# Patient Record
Sex: FEMALE | Race: ASIAN | Employment: UNEMPLOYED | URBAN - METROPOLITAN AREA
[De-identification: names, ages, dates, MRNs, and addresses within clinical notes are randomized per-mention and may not be internally consistent; named-entity substitution may affect disease eponyms.]

---

## 2019-01-30 NOTE — LETTER
Sima Malloy, :1965    CONSENT FOR PROCEDURE/SEDATION    1. I authorize the performance upon Sima Malloy  the following: Cervical Biopsy    2. I authorize Dr. Tomasa Briggs MD (and whomever is designated as the doctor’s assistant), to perform the above-mentioned procedures.    3. If any unforeseen conditions arise during this procedure calling for additional  procedures, operations, or medications (including anesthesia and blood transfusion), I further request and authorize the doctor to do whatever he/she deems advisable in my interest.    4. I consent to the taking and reproduction of any photographs in the course of this procedure for professional purposes.    5. I consent to the administration of such sedation as may be considered necessary or advisable by the physician responsible for this service, with the exception of ______________________________________________________    6. I have been informed by my doctor of the nature and purpose of this procedure sedation, possible alternative methods of treatment, risk involved and possible complications.    7. If I have a Do Not Resuscitate (DNR) order in place, the physician and I (or the individual authorized to consent on my behalf) will discuss and agree as to whether the Do Not Resuscitate (DNR) order will remain in effect or will be discontinued during the performance of the procedure and the applicable recovery period. If the Do Not Resuscitate (DNR) order is discontinued and is to be reinstated following the procedure/recovery period, the physician will determine when the applicable recovery period ends for purposes of reinstating the Do Not Resuscitate (DNR) order.    Signature of Patient:_______________________________________________    Signature of person authorized to consent for patient:  _______________________________________________________________    Relationship to patient: ____________________________________________    Witness:  _________________________________________ Date:___________     Physician Signature: _______________________________ Date:___________     no

## 2024-09-11 ENCOUNTER — APPOINTMENT (OUTPATIENT)
Dept: GENERAL RADIOLOGY | Facility: HOSPITAL | Age: 59
End: 2024-09-11
Attending: EMERGENCY MEDICINE
Payer: MEDICAID

## 2024-09-11 ENCOUNTER — HOSPITAL ENCOUNTER (EMERGENCY)
Facility: HOSPITAL | Age: 59
Discharge: HOME | End: 2024-09-11
Attending: EMERGENCY MEDICINE
Payer: MEDICAID

## 2024-09-11 ENCOUNTER — APPOINTMENT (OUTPATIENT)
Dept: CT IMAGING | Facility: HOSPITAL | Age: 59
End: 2024-09-11
Attending: EMERGENCY MEDICINE
Payer: MEDICAID

## 2024-09-11 VITALS
DIASTOLIC BLOOD PRESSURE: 78 MMHG | SYSTOLIC BLOOD PRESSURE: 135 MMHG | RESPIRATION RATE: 20 BRPM | OXYGEN SATURATION: 93 % | TEMPERATURE: 99 F | HEART RATE: 88 BPM

## 2024-09-11 DIAGNOSIS — J22 LOWER RESPIRATORY INFECTION (E.G., BRONCHITIS, PNEUMONIA, PNEUMONITIS, PULMONITIS): Primary | ICD-10-CM

## 2024-09-11 DIAGNOSIS — J98.01 BRONCHOSPASM: ICD-10-CM

## 2024-09-11 LAB
ALBUMIN SERPL-MCNC: 4.6 G/DL (ref 3.2–4.8)
ALBUMIN/GLOB SERPL: 1.2 {RATIO} (ref 1–2)
ALP LIVER SERPL-CCNC: 75 U/L
ALT SERPL-CCNC: 15 U/L
ANION GAP SERPL CALC-SCNC: 6 MMOL/L (ref 0–18)
AST SERPL-CCNC: 16 U/L (ref ?–34)
ATRIAL RATE: 77 BPM
BASOPHILS # BLD AUTO: 0.02 X10(3) UL (ref 0–0.2)
BASOPHILS NFR BLD AUTO: 0.2 %
BILIRUB SERPL-MCNC: 0.7 MG/DL (ref 0.2–1.1)
BUN BLD-MCNC: 10 MG/DL (ref 9–23)
CALCIUM BLD-MCNC: 9.6 MG/DL (ref 8.7–10.4)
CHLORIDE SERPL-SCNC: 105 MMOL/L (ref 98–112)
CO2 SERPL-SCNC: 26 MMOL/L (ref 21–32)
CREAT BLD-MCNC: 0.79 MG/DL
D DIMER PPP FEU-MCNC: 1.04 UG/ML FEU (ref ?–0.6)
EGFRCR SERPLBLD CKD-EPI 2021: 86 ML/MIN/1.73M2 (ref 60–?)
EOSINOPHIL # BLD AUTO: 0.18 X10(3) UL (ref 0–0.7)
EOSINOPHIL NFR BLD AUTO: 1.7 %
ERYTHROCYTE [DISTWIDTH] IN BLOOD BY AUTOMATED COUNT: 12.6 %
FLUAV + FLUBV RNA SPEC NAA+PROBE: NEGATIVE
FLUAV + FLUBV RNA SPEC NAA+PROBE: NEGATIVE
GLOBULIN PLAS-MCNC: 3.7 G/DL (ref 2–3.5)
GLUCOSE BLD-MCNC: 108 MG/DL (ref 70–99)
HCT VFR BLD AUTO: 43.1 %
HGB BLD-MCNC: 15 G/DL
IMM GRANULOCYTES # BLD AUTO: 0.03 X10(3) UL (ref 0–1)
IMM GRANULOCYTES NFR BLD: 0.3 %
LYMPHOCYTES # BLD AUTO: 1.22 X10(3) UL (ref 1–4)
LYMPHOCYTES NFR BLD AUTO: 11.6 %
MCH RBC QN AUTO: 30.9 PG (ref 26–34)
MCHC RBC AUTO-ENTMCNC: 34.8 G/DL (ref 31–37)
MCV RBC AUTO: 88.7 FL
MONOCYTES # BLD AUTO: 0.37 X10(3) UL (ref 0.1–1)
MONOCYTES NFR BLD AUTO: 3.5 %
NEUTROPHILS # BLD AUTO: 8.69 X10 (3) UL (ref 1.5–7.7)
NEUTROPHILS # BLD AUTO: 8.69 X10(3) UL (ref 1.5–7.7)
NEUTROPHILS NFR BLD AUTO: 82.7 %
NT-PROBNP SERPL-MCNC: 72 PG/ML (ref ?–125)
OSMOLALITY SERPL CALC.SUM OF ELEC: 284 MOSM/KG (ref 275–295)
P AXIS: 39 DEGREES
P-R INTERVAL: 140 MS
PLATELET # BLD AUTO: 224 10(3)UL (ref 150–450)
POTASSIUM SERPL-SCNC: 3.2 MMOL/L (ref 3.5–5.1)
PROT SERPL-MCNC: 8.3 G/DL (ref 5.7–8.2)
Q-T INTERVAL: 404 MS
QRS DURATION: 102 MS
QTC CALCULATION (BEZET): 457 MS
R AXIS: -43 DEGREES
RBC # BLD AUTO: 4.86 X10(6)UL
RSV RNA SPEC NAA+PROBE: NEGATIVE
SARS-COV-2 RNA RESP QL NAA+PROBE: NOT DETECTED
SODIUM SERPL-SCNC: 137 MMOL/L (ref 136–145)
T AXIS: 51 DEGREES
VENTRICULAR RATE: 77 BPM
WBC # BLD AUTO: 10.5 X10(3) UL (ref 4–11)

## 2024-09-11 PROCEDURE — 99285 EMERGENCY DEPT VISIT HI MDM: CPT

## 2024-09-11 PROCEDURE — 71275 CT ANGIOGRAPHY CHEST: CPT | Performed by: EMERGENCY MEDICINE

## 2024-09-11 PROCEDURE — 71045 X-RAY EXAM CHEST 1 VIEW: CPT | Performed by: EMERGENCY MEDICINE

## 2024-09-11 PROCEDURE — 94640 AIRWAY INHALATION TREATMENT: CPT

## 2024-09-11 PROCEDURE — 85379 FIBRIN DEGRADATION QUANT: CPT | Performed by: EMERGENCY MEDICINE

## 2024-09-11 PROCEDURE — 96360 HYDRATION IV INFUSION INIT: CPT

## 2024-09-11 PROCEDURE — 0241U SARS-COV-2/FLU A AND B/RSV BY PCR (GENEXPERT): CPT | Performed by: EMERGENCY MEDICINE

## 2024-09-11 PROCEDURE — 80053 COMPREHEN METABOLIC PANEL: CPT | Performed by: EMERGENCY MEDICINE

## 2024-09-11 PROCEDURE — 93005 ELECTROCARDIOGRAM TRACING: CPT

## 2024-09-11 PROCEDURE — 93010 ELECTROCARDIOGRAM REPORT: CPT

## 2024-09-11 PROCEDURE — 83880 ASSAY OF NATRIURETIC PEPTIDE: CPT | Performed by: EMERGENCY MEDICINE

## 2024-09-11 PROCEDURE — 96361 HYDRATE IV INFUSION ADD-ON: CPT

## 2024-09-11 PROCEDURE — 85025 COMPLETE CBC W/AUTO DIFF WBC: CPT | Performed by: EMERGENCY MEDICINE

## 2024-09-11 RX ORDER — SODIUM CHLORIDE 9 MG/ML
1000 INJECTION, SOLUTION INTRAVENOUS ONCE
Status: COMPLETED | OUTPATIENT
Start: 2024-09-11 | End: 2024-09-11

## 2024-09-11 RX ORDER — ALBUTEROL SULFATE 90 UG/1
2 INHALANT RESPIRATORY (INHALATION) EVERY 4 HOURS PRN
Qty: 1 EACH | Refills: 0 | Status: SHIPPED | OUTPATIENT
Start: 2024-09-11 | End: 2024-10-11

## 2024-09-11 RX ORDER — POTASSIUM CHLORIDE 1500 MG/1
40 TABLET, EXTENDED RELEASE ORAL ONCE
Status: COMPLETED | OUTPATIENT
Start: 2024-09-11 | End: 2024-09-11

## 2024-09-11 RX ORDER — ALBUTEROL SULFATE 5 MG/ML
10 SOLUTION RESPIRATORY (INHALATION) ONCE
Status: COMPLETED | OUTPATIENT
Start: 2024-09-11 | End: 2024-09-11

## 2024-09-11 RX ORDER — PREDNISONE 20 MG/1
40 TABLET ORAL DAILY
Qty: 10 TABLET | Refills: 0 | Status: SHIPPED | OUTPATIENT
Start: 2024-09-11 | End: 2024-09-16

## 2024-09-11 RX ORDER — DOXYCYCLINE 100 MG/1
100 CAPSULE ORAL 2 TIMES DAILY
Qty: 14 CAPSULE | Refills: 0 | Status: SHIPPED | OUTPATIENT
Start: 2024-09-11 | End: 2024-09-18

## 2024-09-11 RX ORDER — PREDNISONE 20 MG/1
60 TABLET ORAL ONCE
Status: COMPLETED | OUTPATIENT
Start: 2024-09-11 | End: 2024-09-11

## 2024-09-11 RX ORDER — IPRATROPIUM BROMIDE AND ALBUTEROL SULFATE 2.5; .5 MG/3ML; MG/3ML
3 SOLUTION RESPIRATORY (INHALATION) ONCE
Status: COMPLETED | OUTPATIENT
Start: 2024-09-11 | End: 2024-09-11

## 2024-09-11 NOTE — ED PROVIDER NOTES
Patient Seen in: [facility] Emergency Department      History     Chief Complaint   Patient presents with    Cough/URI    Dizziness    Difficulty Breathing     Stated Complaint: KEYONNA, Cough, Dizziness    Subjective:   HPI    58-year-old female presents for evaluation of dry cough, lightheadedness and vague shortness of breath for about 2 weeks.  No fever or bodyaches.  No sore throat.  No known sick contacts.  History of pneumonia treated with over-the-counter medications by PCP several years ago.  Denies history of TB.    Objective:   History reviewed. No pertinent past medical history.           Past Surgical History:   Procedure Laterality Date    Removal gallbladder                  Social History     Socioeconomic History    Marital status:    Tobacco Use    Smoking status: Never    Smokeless tobacco: Never   Substance and Sexual Activity    Alcohol use: Never    Drug use: Never              Review of Systems    Positive for stated Chief Complaint: Cough/URI, Dizziness, and Difficulty Breathing    Other systems are as noted in HPI.  Constitutional and vital signs reviewed.      All other systems reviewed and negative except as noted above.    Physical Exam     ED Triage Vitals   BP 09/11/24 0809 (!) 138/93   Pulse 09/11/24 0805 82   Resp 09/11/24 0809 20   Temp 09/11/24 0809 98.5 °F (36.9 °C)   Temp src 09/11/24 0809 Esophageal   SpO2 09/11/24 0805 93 %   O2 Device 09/11/24 0805 None (Room air)       Current Vitals:   Vital Signs  BP: (!) 147/100  Pulse: 66  Resp: 20  Temp: 98.5 °F (36.9 °C)  Temp src: Esophageal  MAP (mmHg): (!) 116    Oxygen Therapy  SpO2: 93 %  O2 Device: None (Room air)            Physical Exam    General: Alert, oriented, no apparent distress  HEENT:  Pupils equal reactive.  Extraocular motions intact. MMM.  Neck: Supple  Lungs: Right-sided crackles  Heart: Regular rate and rhythm.  Abdomen: Soft, nontender.   Skin: No rash.  No edema.  Neurologic: No focal neurologic  deficits.  Normal speech pattern  Musculoskeletal: No tenderness or deformity noted.  Full range of motion throughout.        ED Course     Labs Reviewed   COMP METABOLIC PANEL (14) - Abnormal; Notable for the following components:       Result Value    Glucose 108 (*)     Total Protein 8.3 (*)     Globulin  3.7 (*)     All other components within normal limits   CBC WITH DIFFERENTIAL WITH PLATELET - Abnormal; Notable for the following components:    Neutrophil Absolute Prelim 8.69 (*)     Neutrophil Absolute 8.69 (*)     All other components within normal limits   D-DIMER - Abnormal; Notable for the following components:    D-Dimer 1.04 (*)     All other components within normal limits   REDRAW POTASSIUM (P) - Abnormal; Notable for the following components:    Potassium 3.2 (*)     All other components within normal limits   PRO BETA NATRIURETIC PEPTIDE - Normal   REDRAW AST (SGOT) (P) - Normal   SARS-COV-2/FLU A AND B/RSV BY PCR (GENEXPERT) - Normal    Narrative:     This test is intended for the qualitative detection and differentiation of SARS-CoV-2, influenza A, influenza B, and respiratory syncytial virus (RSV) viral RNA in nasopharyngeal or nares swabs from individuals suspected of respiratory viral infection consistent with COVID-19 by their healthcare provider. Signs and symptoms of respiratory viral infection due to SARS-CoV-2, influenza, and RSV can be similar.    Test performed using the Xpert Xpress SARS-CoV-2/FLU/RSV (real time RT-PCR)  assay on the GeneXpert instrument, [employer], [place] [address].   This test is being used under the Food and Drug Administration's Emergency Use Authorization.    The authorized Fact Sheet for Healthcare Providers for this assay is available upon request from the laboratory.   RAINBOW DRAW LAVENDER   RAINBOW DRAW LIGHT GREEN   RAINBOW DRAW BLUE     EKG    Rate, intervals and axes as noted on EKG Report.  Rate: 77  Rhythm: Sinus Rhythm  Reading: Frequent PVCs, no ST  elevation                          MDM      Differential diagnosis includes pneumonia, viral syndrome, pulmonary edema, pneumonia      I have independently visualized the radiology images, my focus/limited interpretation: No focal consolidation or pulmonary edema    Defer to radiologist for other/incidental findings        CBC normal.    Chemistry with slightly low potassium which was repleted.    CTA CHEST (CPT=71275)    Result Date: 9/11/2024  PROCEDURE:  CT ANGIOGRAPHY, CHEST (CPT=71275)  COMPARISON:  [facility] , XR, XR CHEST AP PORTABLE  (CPT=71045), 9/11/2024, 8:58 AM.  INDICATIONS:  KEYONNA, Cough, Dizziness  TECHNIQUE:  IV contrast-enhanced multislice CT angiography is performed through the pulmonary arterial anatomy. 3D volume renderings are generated.  Dose reduction techniques were used. Dose information is transmitted to the ACR (American College of Radiology) NRDR (National Radiology Data Registry) which includes the Dose Index Registry.  PATIENT STATED HISTORY:(As transcribed by Technologist)  Patient is here for shortness of breath, cough and dizziness for 2 weeks.   CONTRAST USED:  100cc of Isovue 370  FINDINGS:  VASCULATURE:  No visible pulmonary arterial thrombus or attenuation.  THORACIC AORTA:  Ascending thoracic aorta measures 4.2 x 3.9 cm.  Descending thoracic aorta measures 2.4 x 2.3 cm.  No visible dissection.  LUNGS:  No focal consolidation.  Left lower lobe pleural based 5 mm nodule is nonspecific.  Peribronchial thickening.  Scattered interstitial densities involve the right middle and bilateral lower lobes suggest atelectasis and or scar.  MEDIASTINUM:  Scattered nodes may be reactive.  ANYI:  Bilateral soft tissue attenuation may be due to reactive nodes.  CARDIAC:  No enlargement, pericardial thickening, or significant coronary artery calcification. PLEURA:  No mass or effusion.  CHEST WALL:  No mass or axillary adenopathy.  LIMITED ABDOMEN:  Limited images of the upper abdomen are  unremarkable.  BONES:  Degenerative change with [id number] osseous hemangioma.  No fracture.             CONCLUSION:  1. No CT evidence for pulmonary embolism. 2. Ascending thoracic aorta measures 4.2 cm.  Follow-up can be performed to ensure stability as clinically indicated. 3. Peribronchial thickening may represent bronchitis, correlate clinically. 4. Left lower lobe pleural based 5 mm nodule is nonspecific.  Follow-up as clinically indicated.  The findings include a single, incidentally detected, solid pulmonary nodule, measuring less than 6mm.  2017 guidelines from the Fleischner Society for the follow-up and management of incidentally detected indeterminate pulmonary nodules in persons at least 35 years of age depend on nodule size (average length and width) and underlying risk factors (including smoking and other risk factors). Please consider the following recommendations after clinical assessment of risk factors.  For <6mm solid nodules: In low risk patients, no follow-up required.  If suspicious morphology or upper lobe location, consider 12 month follow-up.  In high risk patients, optional CT in 12 months.     LOCATION:  [facility]   Dictated by (CST): Loretta Moore MD on 9/11/2024 at 10:45 AM     Finalized by (CST): Loretta Moore MD on 9/11/2024 at 10:51 AM       XR CHEST AP PORTABLE  (CPT=71045)    Result Date: 9/11/2024  PROCEDURE:  XR CHEST AP PORTABLE  (CPT=71045)  TECHNIQUE:  AP chest radiograph was obtained.  COMPARISON:  None.  INDICATIONS:  KEYONNA, Cough, Dizziness  PATIENT STATED HISTORY: (As transcribed by Technologist)  Cough, wheezing, difficulty breathing for 2 weeks.              CONCLUSION:   Heart size upper limits normal.  Mild diffuse interstitial and peribronchial thickening suggesting bronchitis or reactive airway disease/asthma.  Patchy left infrahilar opacity favors partial atelectasis.  No associated pleural effusion or pneumothorax.  LOCATION:  [facility]      Dictated by (CST): Shavonne Choudhary MD on  9/11/2024 at 9:08 AM     Finalized by (CST): Shavonne Choudhary MD on 9/11/2024 at 9:09 AM        Medications   sodium chloride 0.9% infusion 1,000 mL (1,000 mL Intravenous New Bag 9/11/24 0842)   ipratropium-albuterol (Duoneb) 0.5-2.5 (3) MG/3ML inhalation solution 3 mL (3 mL Nebulization Given 9/11/24 0922)   iopamidol 76% (ISOVUE-370) injection for power injector (100 mL Intravenous Given 9/11/24 1036)   potassium chloride (Klor-Con M20) tab 40 mEq (40 mEq Oral Given 9/11/24 1114)   predniSONE (Deltasone) tab 60 mg (60 mg Oral Given 9/11/24 1114)   albuterol (Ventolin) (5 MG/ML) 0.5% nebulizer solution 10 mg (10 mg Nebulization Given 9/11/24 1141)     Better after medication.  Plan for prednisone, albuterol and doxycycline at home.  Follow-up with referral physician if not better in about 3 days.  Return here if symptoms are worsening or changing                  Medical Decision Making  Amount and/or Complexity of Data Reviewed  Independent Historian: caregiver     Details: Son with whom she lives at bedside        Disposition and Plan     Clinical Impression:  1. Lower respiratory infection (e.g., bronchitis, pneumonia, pneumonitis, pulmonitis)    2. Bronchospasm         Disposition:  Discharge  9/11/2024 12:16 pm    Follow-up:  Jeramie Bean MD  [address] [place] [address]  [phone]    Schedule an appointment as soon as possible for a visit in 3 day(s)            Medications Prescribed:  Current Discharge Medication List        START taking these medications    Details   predniSONE 20 MG Oral Tab Take 2 tablets (40 mg total) by mouth daily for 5 days.  Qty: 10 tablet, Refills: 0      doxycycline 100 MG Oral Cap Take 1 capsule (100 mg total) by mouth 2 (two) times daily for 7 days.  Qty: 14 capsule, Refills: 0      albuterol 108 (90 Base) MCG/ACT Inhalation Aero Soln Inhale 2 puffs into the lungs every 4 (four) hours as needed for Wheezing.  Qty: 1 each, Refills: 0

## 2024-09-17 ENCOUNTER — TELEPHONE (OUTPATIENT)
Dept: FAMILY MEDICINE CLINIC | Facility: CLINIC | Age: 59
End: 2024-09-17
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
## 2024-09-18 ENCOUNTER — TELEPHONE (OUTPATIENT)
Facility: CLINIC | Age: 59
End: 2024-09-18

## 2024-09-18 NOTE — TELEPHONE ENCOUNTER
LVM on son's line as requested to offer pulmonary consult. We are holding a potential appointment for Friday 9/20 as pt does see their new PCP on Thursday.   LVM with our contact information that we are available if they wish to schedule an appointment.

## 2024-09-19 ENCOUNTER — LAB ENCOUNTER (OUTPATIENT)
Dept: LAB | Age: 59
End: 2024-09-19
Attending: STUDENT IN AN ORGANIZED HEALTH CARE EDUCATION/TRAINING PROGRAM
Payer: MEDICAID

## 2024-09-19 ENCOUNTER — HOSPITAL ENCOUNTER (OUTPATIENT)
Dept: GENERAL RADIOLOGY | Age: 59
Discharge: HOME | End: 2024-09-19
Attending: STUDENT IN AN ORGANIZED HEALTH CARE EDUCATION/TRAINING PROGRAM
Payer: MEDICAID

## 2024-09-19 ENCOUNTER — OFFICE VISIT (OUTPATIENT)
Dept: INTERNAL MEDICINE CLINIC | Facility: CLINIC | Age: 59
End: 2024-09-19
Payer: MEDICAID

## 2024-09-19 VITALS
OXYGEN SATURATION: 95 % | HEART RATE: 70 BPM | SYSTOLIC BLOOD PRESSURE: 128 MMHG | DIASTOLIC BLOOD PRESSURE: 80 MMHG | BODY MASS INDEX: 32.94 KG/M2 | RESPIRATION RATE: 17 BRPM | WEIGHT: 179 LBS | TEMPERATURE: 98 F | HEIGHT: 62 IN

## 2024-09-19 DIAGNOSIS — Z13.0 SCREENING FOR DEFICIENCY ANEMIA: ICD-10-CM

## 2024-09-19 DIAGNOSIS — Z00.00 PHYSICAL EXAM, ANNUAL: Primary | ICD-10-CM

## 2024-09-19 DIAGNOSIS — R41.3 MEMORY CHANGES: ICD-10-CM

## 2024-09-19 DIAGNOSIS — I77.810 ASCENDING AORTA DILATATION (HCC): ICD-10-CM

## 2024-09-19 DIAGNOSIS — J40 BRONCHITIS: ICD-10-CM

## 2024-09-19 DIAGNOSIS — Z11.59 NEED FOR HEPATITIS C SCREENING TEST: ICD-10-CM

## 2024-09-19 DIAGNOSIS — Z13.29 SCREENING FOR THYROID DISORDER: ICD-10-CM

## 2024-09-19 DIAGNOSIS — Z13.228 SCREENING FOR ENDOCRINE, METABOLIC AND IMMUNITY DISORDER: ICD-10-CM

## 2024-09-19 DIAGNOSIS — Z11.4 SCREENING FOR HIV (HUMAN IMMUNODEFICIENCY VIRUS): ICD-10-CM

## 2024-09-19 DIAGNOSIS — Z13.220 SCREENING FOR LIPID DISORDERS: ICD-10-CM

## 2024-09-19 DIAGNOSIS — Z12.11 SCREENING FOR COLON CANCER: ICD-10-CM

## 2024-09-19 DIAGNOSIS — Z13.0 SCREENING FOR ENDOCRINE, METABOLIC AND IMMUNITY DISORDER: ICD-10-CM

## 2024-09-19 DIAGNOSIS — R68.89 FORGETFULNESS: ICD-10-CM

## 2024-09-19 DIAGNOSIS — R39.15 URINARY URGENCY: ICD-10-CM

## 2024-09-19 DIAGNOSIS — R06.02 SHORTNESS OF BREATH: ICD-10-CM

## 2024-09-19 DIAGNOSIS — J20.8 VIRAL BRONCHITIS: ICD-10-CM

## 2024-09-19 DIAGNOSIS — Z12.4 SCREENING FOR CERVICAL CANCER: ICD-10-CM

## 2024-09-19 DIAGNOSIS — Z13.1 SCREENING FOR DIABETES MELLITUS: ICD-10-CM

## 2024-09-19 DIAGNOSIS — E55.9 VITAMIN D DEFICIENCY: ICD-10-CM

## 2024-09-19 DIAGNOSIS — I49.3 PVC'S (PREMATURE VENTRICULAR CONTRACTIONS): ICD-10-CM

## 2024-09-19 DIAGNOSIS — Z11.59 NEED FOR HEPATITIS B SCREENING TEST: ICD-10-CM

## 2024-09-19 DIAGNOSIS — R05.2 SUBACUTE COUGH: ICD-10-CM

## 2024-09-19 DIAGNOSIS — Z13.29 SCREENING FOR ENDOCRINE, METABOLIC AND IMMUNITY DISORDER: ICD-10-CM

## 2024-09-19 DIAGNOSIS — J01.90 ACUTE NON-RECURRENT SINUSITIS, UNSPECIFIED LOCATION: ICD-10-CM

## 2024-09-19 DIAGNOSIS — Z12.31 SCREENING MAMMOGRAM FOR BREAST CANCER: ICD-10-CM

## 2024-09-19 LAB
ALBUMIN SERPL-MCNC: 4.3 G/DL (ref 3.2–4.8)
ALBUMIN/GLOB SERPL: 1.3 {RATIO} (ref 1–2)
ALP LIVER SERPL-CCNC: 78 U/L
ALT SERPL-CCNC: 22 U/L
ANION GAP SERPL CALC-SCNC: 8 MMOL/L (ref 0–18)
AST SERPL-CCNC: 25 U/L (ref ?–34)
BASOPHILS # BLD AUTO: 0.07 X10(3) UL (ref 0–0.2)
BASOPHILS NFR BLD AUTO: 0.9 %
BILIRUB SERPL-MCNC: 0.8 MG/DL (ref 0.2–1.1)
BILIRUB UR QL STRIP.AUTO: NEGATIVE
BUN BLD-MCNC: 15 MG/DL (ref 9–23)
CALCIUM BLD-MCNC: 9.9 MG/DL (ref 8.7–10.4)
CHLORIDE SERPL-SCNC: 104 MMOL/L (ref 98–112)
CHOLEST SERPL-MCNC: 189 MG/DL (ref ?–200)
CO2 SERPL-SCNC: 29 MMOL/L (ref 21–32)
COLOR UR AUTO: YELLOW
CREAT BLD-MCNC: 0.83 MG/DL
EGFRCR SERPLBLD CKD-EPI 2021: 80 ML/MIN/1.73M2 (ref 60–?)
EOSINOPHIL # BLD AUTO: 0.36 X10(3) UL (ref 0–0.7)
EOSINOPHIL NFR BLD AUTO: 4.5 %
ERYTHROCYTE [DISTWIDTH] IN BLOOD BY AUTOMATED COUNT: 12.7 %
EST. AVERAGE GLUCOSE BLD GHB EST-MCNC: 128 MG/DL (ref 68–126)
FASTING PATIENT LIPID ANSWER: YES
FASTING STATUS PATIENT QL REPORTED: YES
FOLATE SERPL-MCNC: 11 NG/ML (ref 5.4–?)
GLOBULIN PLAS-MCNC: 3.3 G/DL (ref 2–3.5)
GLUCOSE BLD-MCNC: 93 MG/DL (ref 70–99)
GLUCOSE UR STRIP.AUTO-MCNC: NORMAL MG/DL
HBA1C MFR BLD: 6.1 % (ref ?–5.7)
HBV SURFACE AB SER QL: NONREACTIVE
HBV SURFACE AB SERPL IA-ACNC: <3.1 MIU/ML
HBV SURFACE AG SER-ACNC: <0.1 [IU]/L
HBV SURFACE AG SERPL QL IA: NONREACTIVE
HCT VFR BLD AUTO: 42.8 %
HCV AB SERPL QL IA: NONREACTIVE
HDLC SERPL-MCNC: 51 MG/DL (ref 40–59)
HGB BLD-MCNC: 14.9 G/DL
HYALINE CASTS #/AREA URNS AUTO: PRESENT /LPF
IMM GRANULOCYTES # BLD AUTO: 0.03 X10(3) UL (ref 0–1)
IMM GRANULOCYTES NFR BLD: 0.4 %
KETONES UR STRIP.AUTO-MCNC: NEGATIVE MG/DL
LDLC SERPL CALC-MCNC: 119 MG/DL (ref ?–100)
LEUKOCYTE ESTERASE UR QL STRIP.AUTO: 250
LYMPHOCYTES # BLD AUTO: 1.96 X10(3) UL (ref 1–4)
LYMPHOCYTES NFR BLD AUTO: 24.3 %
MCH RBC QN AUTO: 30.8 PG (ref 26–34)
MCHC RBC AUTO-ENTMCNC: 34.8 G/DL (ref 31–37)
MCV RBC AUTO: 88.6 FL
MONOCYTES # BLD AUTO: 0.52 X10(3) UL (ref 0.1–1)
MONOCYTES NFR BLD AUTO: 6.4 %
NEUTROPHILS # BLD AUTO: 5.14 X10 (3) UL (ref 1.5–7.7)
NEUTROPHILS # BLD AUTO: 5.14 X10(3) UL (ref 1.5–7.7)
NEUTROPHILS NFR BLD AUTO: 63.5 %
NITRITE UR QL STRIP.AUTO: NEGATIVE
NONHDLC SERPL-MCNC: 138 MG/DL (ref ?–130)
OSMOLALITY SERPL CALC.SUM OF ELEC: 293 MOSM/KG (ref 275–295)
PH UR STRIP.AUTO: 5.5 [PH] (ref 5–8)
PLATELET # BLD AUTO: 238 10(3)UL (ref 150–450)
POTASSIUM SERPL-SCNC: 3.1 MMOL/L (ref 3.5–5.1)
PROT SERPL-MCNC: 7.6 G/DL (ref 5.7–8.2)
PROT UR STRIP.AUTO-MCNC: 20 MG/DL
RBC # BLD AUTO: 4.83 X10(6)UL
SODIUM SERPL-SCNC: 141 MMOL/L (ref 136–145)
SP GR UR STRIP.AUTO: 1.02 (ref 1–1.03)
TRIGL SERPL-MCNC: 106 MG/DL (ref 30–149)
TSI SER-ACNC: 3.75 MIU/ML (ref 0.55–4.78)
UROBILINOGEN UR STRIP.AUTO-MCNC: NORMAL MG/DL
VIT B12 SERPL-MCNC: 436 PG/ML (ref 211–911)
VIT D+METAB SERPL-MCNC: 20.8 NG/ML (ref 30–100)
VLDLC SERPL CALC-MCNC: 19 MG/DL (ref 0–30)
WBC # BLD AUTO: 8.1 X10(3) UL (ref 4–11)

## 2024-09-19 PROCEDURE — 83036 HEMOGLOBIN GLYCOSYLATED A1C: CPT

## 2024-09-19 PROCEDURE — 87186 SC STD MICRODIL/AGAR DIL: CPT | Performed by: STUDENT IN AN ORGANIZED HEALTH CARE EDUCATION/TRAINING PROGRAM

## 2024-09-19 PROCEDURE — 80061 LIPID PANEL: CPT

## 2024-09-19 PROCEDURE — 87340 HEPATITIS B SURFACE AG IA: CPT

## 2024-09-19 PROCEDURE — 36415 COLL VENOUS BLD VENIPUNCTURE: CPT

## 2024-09-19 PROCEDURE — 87077 CULTURE AEROBIC IDENTIFY: CPT | Performed by: STUDENT IN AN ORGANIZED HEALTH CARE EDUCATION/TRAINING PROGRAM

## 2024-09-19 PROCEDURE — 84443 ASSAY THYROID STIM HORMONE: CPT

## 2024-09-19 PROCEDURE — 80053 COMPREHEN METABOLIC PANEL: CPT

## 2024-09-19 PROCEDURE — 86003 ALLG SPEC IGE CRUDE XTRC EA: CPT

## 2024-09-19 PROCEDURE — 86803 HEPATITIS C AB TEST: CPT

## 2024-09-19 PROCEDURE — 82607 VITAMIN B-12: CPT

## 2024-09-19 PROCEDURE — 87086 URINE CULTURE/COLONY COUNT: CPT | Performed by: STUDENT IN AN ORGANIZED HEALTH CARE EDUCATION/TRAINING PROGRAM

## 2024-09-19 PROCEDURE — 82306 VITAMIN D 25 HYDROXY: CPT

## 2024-09-19 PROCEDURE — 84207 ASSAY OF VITAMIN B-6: CPT

## 2024-09-19 PROCEDURE — 87389 HIV-1 AG W/HIV-1&-2 AB AG IA: CPT

## 2024-09-19 PROCEDURE — 82785 ASSAY OF IGE: CPT

## 2024-09-19 PROCEDURE — 81001 URINALYSIS AUTO W/SCOPE: CPT | Performed by: STUDENT IN AN ORGANIZED HEALTH CARE EDUCATION/TRAINING PROGRAM

## 2024-09-19 PROCEDURE — 86706 HEP B SURFACE ANTIBODY: CPT

## 2024-09-19 PROCEDURE — 85025 COMPLETE CBC W/AUTO DIFF WBC: CPT

## 2024-09-19 PROCEDURE — 82746 ASSAY OF FOLIC ACID SERUM: CPT

## 2024-09-19 PROCEDURE — 71046 X-RAY EXAM CHEST 2 VIEWS: CPT | Performed by: STUDENT IN AN ORGANIZED HEALTH CARE EDUCATION/TRAINING PROGRAM

## 2024-09-19 RX ORDER — NEBULIZER ACCESSORIES
KIT MISCELLANEOUS
Qty: 1 EACH | Refills: 0 | Status: SHIPPED | OUTPATIENT
Start: 2024-09-19

## 2024-09-19 RX ORDER — FLUTICASONE PROPIONATE 50 MCG
1 SPRAY, SUSPENSION (ML) NASAL DAILY
Qty: 11.1 ML | Refills: 0 | Status: SHIPPED | OUTPATIENT
Start: 2024-09-19 | End: 2025-09-14

## 2024-09-19 RX ORDER — CODEINE PHOSPHATE AND GUAIFENESIN 10; 100 MG/5ML; MG/5ML
5 SOLUTION ORAL NIGHTLY PRN
Qty: 180 ML | Refills: 0 | Status: SHIPPED | OUTPATIENT
Start: 2024-09-19

## 2024-09-19 RX ORDER — BENZONATATE 200 MG/1
200 CAPSULE ORAL 3 TIMES DAILY PRN
Qty: 30 CAPSULE | Refills: 0 | Status: SHIPPED | OUTPATIENT
Start: 2024-09-19

## 2024-09-19 RX ORDER — PREDNISONE 20 MG/1
TABLET ORAL
Qty: 7 TABLET | Refills: 0 | Status: SHIPPED | OUTPATIENT
Start: 2024-09-19 | End: 2024-09-25

## 2024-09-19 RX ORDER — IPRATROPIUM BROMIDE AND ALBUTEROL SULFATE 2.5; .5 MG/3ML; MG/3ML
3 SOLUTION RESPIRATORY (INHALATION) EVERY 6 HOURS PRN
Qty: 84 ML | Refills: 1 | Status: SHIPPED | OUTPATIENT
Start: 2024-09-19

## 2024-09-19 RX ORDER — AZITHROMYCIN 250 MG/1
TABLET, FILM COATED ORAL
Qty: 6 TABLET | Refills: 0 | Status: SHIPPED | OUTPATIENT
Start: 2024-09-19 | End: 2024-09-23

## 2024-09-19 NOTE — PROGRESS NOTES
[facility]    CHIEF COMPLAINT:   Chief Complaint   Patient presents with    ER F/U     Cough, SOB, Headache.     Routine Physical     New patient.         HPI:   Sima Malloy is a 59 year old female who presents for a complete physical exam.   Patient Active Problem List   Diagnosis    Ascending aorta dilatation (HCC)    PVC's (premature ventricular contractions)    Vitamin D deficiency    Memory changes      [place] originally from [place], [place].  Has 3 children, son in [place] (radiologist). Another son and a daughter are in the [place].    for 38 years.    Today c/o Cough for the past 3 weeks, was in the ER 1 week ago for the cough  CXR: showed :  Heart size upper limits normal.  Mild diffuse interstitial and peribronchial thickening suggesting bronchitis or reactive airway disease/asthma.  Patchy left infrahilar opacity favors partial atelectasis. No associated pleural effusion or pneumothorax.   EKG: Sinus rhythm with frequent Premature ventricular complexes   Nonspecific T wave abnormality.  Labs : CBC with normal WBC, but  mild left shift  CMP with Normal Creatinine and ALT, Potassium was slightly low - replaced.  COVID negative  Pro BMP normal   D dimer was elevated, CTA was done, was negative for PE, but  showed : Ascending thoracic aorta measures 4.2 cm, Peribronchial thickening may represent bronchitis, Left lower lobe pleural based 5 mm nodule is nonspecific.     Was prescribed:  Prednisone 40 mg for 5 days - completed  Albuterol inhaler - takes 3-4 times a day  Doxycycline 100 mg BID - took 7 day course, completed    No improvement after the treatment and symptoms are becoming worse and now wake her up from sleep. Cough is unproductive.    Had similar cough in the past (about 6 months ago), was prescribed Azithromycin with resolution of the cough at that time.      Also c/o other multiple issues, including urinary frequency and urgency, fatigue, forgetfulness.    Diet:  [place] diet, high on fats and carbs  Exercise:  not much    Vaccinations:  Influenza: Will need to get in the pharmacy  COVID: Will need to get in the pharmacy  RSV:  8/20/2024  Pneumococcal: 8/20/2024 (Prevnar 20)  Shingles: Shingrix x 1 (8/20/2024)  Tdap/Td: 08/20/2024    Screenings:  Mammogram: Never done, will order   Colonoscopy screen: Never done, will send referral to GI  Dexa: Not due  PAP: Never done, will send referral to GYN    Diabetes screen? (age 35 to 70 who are overweight or obese): A1c ordered  Lipid panel? (age 20-35 with increased risk of CHD or older than 35): ordered    Wt Readings from Last 6 Encounters:   09/19/24 179 lb (81.2 kg)     Body mass index is 32.74 kg/m².       Current Outpatient Medications   Medication Sig Dispense Refill    benzonatate 200 MG Oral Cap Take 1 capsule (200 mg total) by mouth 3 (three) times daily as needed. 30 capsule 0    guaiFENesin-codeine 100-10 MG/5ML Oral Solution Take 5 mL by mouth nightly as needed for cough. 180 mL 0    Respiratory Therapy Supplies (NEBULIZER/TUBING/MOUTHPIECE) Does not apply Kit To use for Neb treatments 1 each 0    fluticasone propionate 50 MCG/ACT Nasal Suspension 1 spray by Each Nare route daily. 11.1 mL 0    predniSONE 20 MG Oral Tab Take 2 tablets (40 mg total) by mouth daily for 2 days, THEN 1 tablet (20 mg total) daily for 2 days, THEN 0.5 tablets (10 mg total) daily for 2 days. 7 tablet 0    ipratropium-albuterol 0.5-2.5 (3) MG/3ML Inhalation Solution Take 3 mL by nebulization every 6 (six) hours as needed. 84 mL 1    azithromycin 250 MG Oral Tab Take 2 tablets (500 mg total) by mouth daily for 1 day, THEN 1 tablet (250 mg total) daily for 4 days. 6 tablet 0    albuterol 108 (90 Base) MCG/ACT Inhalation Aero Soln Inhale 2 puffs into the lungs every 4 (four) hours as needed for Wheezing. 1 each 0      History reviewed. No pertinent past medical history.   Past Surgical History:   Procedure Laterality Date    Removal gallbladder         Family History   Problem Relation Age of Onset    Stroke Father     Diabetes Mother     Diabetes Son     Diabetes Sister     Diabetes Brother            REVIEW OF SYSTEMS:   Review of Systems   Constitutional:  Positive for malaise/fatigue. Negative for chills and fever.   HENT:  Positive for congestion and sore throat. Negative for ear discharge, ear pain and sinus pain.    Eyes: Negative.    Respiratory:  Positive for cough, shortness of breath and wheezing. Negative for hemoptysis, sputum production and stridor.    Cardiovascular: Negative.  Negative for leg swelling.   Gastrointestinal:  Negative for abdominal pain, constipation, diarrhea, heartburn, nausea and vomiting.   Genitourinary:  Positive for urgency.   Musculoskeletal: Negative.    Skin:  Negative for itching and rash.   Neurological:  Negative for dizziness, tingling, sensory change, seizures, loss of consciousness and weakness.        EXAM:   /80 (BP Location: Right arm, Patient Position: Sitting, Cuff Size: adult)   Pulse 70   Temp 97.9 °F (36.6 °C)   Resp 17   Ht 5' 2\" (1.575 m)   Wt 179 lb (81.2 kg)   SpO2 95%   BMI 32.74 kg/m²   Body mass index is 32.74 kg/m².   Physical Exam  Constitutional:       General: She is not in acute distress.     Appearance: She is obese. She is not ill-appearing or toxic-appearing.   HENT:      Head: Normocephalic and atraumatic.      Right Ear: Tympanic membrane, ear canal and external ear normal.      Left Ear: Tympanic membrane, ear canal and external ear normal.      Nose: Congestion and rhinorrhea present.      Mouth/Throat:      Pharynx: No oropharyngeal exudate or posterior oropharyngeal erythema.   Eyes:      Extraocular Movements: Extraocular movements intact.      Conjunctiva/sclera: Conjunctivae normal.      Pupils: Pupils are equal, round, and reactive to light.   Cardiovascular:      Rate and Rhythm: Normal rate.      Heart sounds: Normal heart sounds.      Comments: Some extra beats  noted  Pulmonary:      Effort: Pulmonary effort is normal. No respiratory distress.      Breath sounds: Wheezing and rhonchi (scattered) present. No rales.   Chest:      Chest wall: No tenderness.   Abdominal:      General: Abdomen is flat. Bowel sounds are normal. There is no distension.      Palpations: Abdomen is soft. There is no mass.      Tenderness: There is no abdominal tenderness. There is no right CVA tenderness, left CVA tenderness or guarding.      Hernia: No hernia is present.   Musculoskeletal:         General: No swelling.      Cervical back: Normal range of motion.      Right lower leg: No edema.      Left lower leg: No edema.   Lymphadenopathy:      Cervical: No cervical adenopathy.   Skin:     General: Skin is warm.      Capillary Refill: Capillary refill takes less than 2 seconds.      Coloration: Skin is not jaundiced or pale.      Findings: No bruising, erythema or lesion.   Neurological:      General: No focal deficit present.      Mental Status: She is alert and oriented to person, place, and time.      Cranial Nerves: No cranial nerve deficit.      Sensory: No sensory deficit.      Motor: No weakness.      Gait: Gait normal.   Psychiatric:         Mood and Affect: Mood normal.         Behavior: Behavior normal.         Thought Content: Thought content normal.         Judgment: Judgment normal.          Labs:   Lab Results   Component Value Date/Time    WBC 10.5 09/11/2024 08:45 AM    HGB 15.0 09/11/2024 08:45 AM    .0 09/11/2024 08:45 AM      Lab Results   Component Value Date/Time     (H) 09/11/2024 08:45 AM     09/11/2024 08:45 AM    K 3.2 (L) 09/11/2024 09:55 AM     09/11/2024 08:45 AM    CO2 26.0 09/11/2024 08:45 AM    CREATSERUM 0.79 09/11/2024 08:45 AM    CA 9.6 09/11/2024 08:45 AM    ALB 4.6 09/11/2024 08:45 AM    TP 8.3 (H) 09/11/2024 08:45 AM    ALKPHO 75 09/11/2024 08:45 AM    AST 16 09/11/2024 09:55 AM    ALT 15 09/11/2024 08:45 AM    BILT 0.7 09/11/2024  08:45 AM        No results found for: \"CHOLEST\", \"HDL\", \"TRIG\", \"LDL\", \"NONHDLC\"    No results found for: \"A1C\"   Vitamin D:    No results found for: \"VITD\"        ASSESSMENT AND PLAN:   Sima Malloy is a 59 year old female who presents for a complete physical exam.     1. Physical exam, annual  Pap, pelvic, and breast exam deferred to GYN. Pt' s weight is Body mass index is 32.74 kg/m². Recommended regular exercise.   Age appropriate screenings discussed and orders placed.  The patient indicates understanding of these issues and agrees to the plan.  Annual screening labs ordered    2. Subacute cough  3. Shortness of breath  Cough not getting better after treatment with Prednisone for 5 days, Doxy for 7 days and albuterol inhaler. Will order CXR to rule out PNA at this time.  Unlikely CHF as Pro BNP was normal , no JVD or LE edema on exam.   Would give additional prednisone taper for 6 days  Will order Azithromycin for its antiinflammatory effects (and patient states it helped her in the past)   Should do Duoneds around the clock QID, states has nebulizer at home. To do benzonatate around the clock.  Depending on CXR - will decide of additional antibiotics needed.  Patient also has appt with Pulmonology for tomorrow  - benzonatate 200 MG Oral Cap; Take 1 capsule (200 mg total) by mouth 3 (three) times daily as needed.  Dispense: 30 capsule; Refill: 0  - guaiFENesin-codeine 100-10 MG/5ML Oral Solution; Take 5 mL by mouth nightly as needed for cough.  Dispense: 180 mL; Refill: 0  - Respiratory Therapy Supplies (NEBULIZER/TUBING/MOUTHPIECE) Does not apply Kit; To use for Neb treatments  Dispense: 1 each; Refill: 0  - fluticasone propionate 50 MCG/ACT Nasal Suspension; 1 spray by Each Nare route daily.  Dispense: 11.1 mL; Refill: 0  - XR CHEST PA + LAT CHEST (CPT=71046); Future  - predniSONE 20 MG Oral Tab; Take 2 tablets (40 mg total) by mouth daily for 2 days, THEN 1 tablet (20 mg total) daily for 2 days, THEN 0.5  tablets (10 mg total) daily for 2 days.  Dispense: 7 tablet; Refill: 0    4. Forgetfulness  Wants to check the vitamins as she was deficient in the past. Would like to get memory testing as well.  - Vitamin B12 [E]; Future  - Folic Acid Serum [E]; Future  - Vitamin B6; Future  - PSYCHOLOGY - INTERNAL    5. Acute non-recurrent sinusitis, unspecified location  Symptoms of nasal congestion, nasal discharge and postnasal drip consistent with sinusitis. Will benefit from Azithromycin.  - azithromycin 250 MG Oral Tab; Take 2 tablets (500 mg total) by mouth daily for 1 day, THEN 1 tablet (250 mg total) daily for 4 days.  Dispense: 6 tablet; Refill: 0    6. Bronchitis  - azithromycin 250 MG Oral Tab; Take 2 tablets (500 mg total) by mouth daily for 1 day, THEN 1 tablet (250 mg total) daily for 4 days.  Dispense: 6 tablet; Refill: 0    7. Ascending aorta dilatation (HCC) - Incidental finding on imaging in the ER  8. PVCs noted on EKG in the ER  - CARD ECHO 2D DOPPLER (CPT=93306); Future  - CT CALCIUM SCORING; Future    9. Screening for deficiency anemia  - CBC With Differential With Platelet; Future    10.  - Vitamin B12 [E]; Future  - Folic Acid Serum [E]; Future  - Vitamin B6; Future  - PSYCHOLOGY - INTERNAL    11. Screening for endocrine, metabolic and immunity disorder  - Comp Metabolic Panel (14); Future    12. Screening for lipid disorders  - Lipid Panel; Future    13. Screening for diabetes mellitus  - Hemoglobin A1C; Future    14. Screening for thyroid disorder  - TSH W Reflex To Free T4; Future    15. Screening for colon cancer  - GASTRO - INTERNAL    16. Need for hepatitis B screening test  - Hepatitis B Surface Antibody; Future  - Hepatitis B Surface Antigen; Future    17. Screening for HIV (human immunodeficiency virus)  - HIV Ag/Ab Combo; Future    18. Need for hepatitis C screening test  - HCV Antibody; Future    19. Screening mammogram for breast cancer  - [facility] IAN 2D+3D SCREENING BILAT (CPT=77067/50786);  Future    20. Screening for cervical cancer  - OBG Referral - In Network    21. Vitamin D deficiency  - Vitamin D; Future     22. Urinary Urgency  - UA/M With Culture Reflex [E]; Future  - UA/M With Culture Reflex [E]    Return in about 6 months (around 3/19/2025) for med check or earlies if needed depending on labs.    Stephy Lora MD

## 2024-09-20 ENCOUNTER — OFFICE VISIT (OUTPATIENT)
Facility: CLINIC | Age: 59
End: 2024-09-20
Payer: MEDICAID

## 2024-09-20 VITALS
BODY MASS INDEX: 32.94 KG/M2 | OXYGEN SATURATION: 98 % | RESPIRATION RATE: 16 BRPM | HEIGHT: 62 IN | WEIGHT: 179 LBS | HEART RATE: 78 BPM

## 2024-09-20 DIAGNOSIS — E87.6 HYPOKALEMIA: Primary | ICD-10-CM

## 2024-09-20 DIAGNOSIS — J20.8 VIRAL BRONCHITIS: Primary | ICD-10-CM

## 2024-09-20 PROCEDURE — 99204 OFFICE O/P NEW MOD 45 MIN: CPT | Performed by: INTERNAL MEDICINE

## 2024-09-20 RX ORDER — BUDESONIDE 0.5 MG/2ML
0.5 INHALANT ORAL DAILY
Qty: 120 ML | Refills: 5 | Status: SHIPPED | OUTPATIENT
Start: 2024-09-20

## 2024-09-20 RX ORDER — POTASSIUM CHLORIDE 1500 MG/1
20 TABLET, EXTENDED RELEASE ORAL DAILY
Qty: 5 TABLET | Refills: 0 | Status: SHIPPED | OUTPATIENT
Start: 2024-09-20

## 2024-09-20 NOTE — PROGRESS NOTES
[facility] General Pulmonary Consult Note    Chief Complaint:  Chief Complaint   Patient presents with    New Patient     Pt c/o cough, wheezing x4 weeks        History of Present Illness:  Sima Malloy is a 59 year old female with no significant PMH who presents today for evaluation of cough.  Patient has had cough for the past 3 weeks - has had 3rd type illness in the past year.  No history of asthma as a child.  Never smoker.  Denies fevers, chills, nausea, or vomiting.  Denies any sick contacts does have a grandchild in .  Patient went to ER a few days ago.  From [facility], lives here.        Past Medical History:   History reviewed. No pertinent past medical history.     Past Surgical History:   Past Surgical History:   Procedure Laterality Date    Removal gallbladder         Family Medical History:   Family History   Problem Relation Age of Onset    Stroke Father     Diabetes Mother     Diabetes Son     Diabetes Sister     Diabetes Brother         Social History:   Social History     Socioeconomic History    Marital status:      Spouse name: Not on file    Number of children: Not on file    Years of education: Not on file    Highest education level: Not on file   Occupational History    Not on file   Tobacco Use    Smoking status: Never     Passive exposure: Never    Smokeless tobacco: Never   Vaping Use    Vaping status: Never Used   Substance and Sexual Activity    Alcohol use: Never    Drug use: Never    Sexual activity: Not on file   Other Topics Concern    Not on file   Social History Narrative    Not on file     Social Determinants of Health     Financial Resource Strain: Not on file   Food Insecurity: Not on file   Transportation Needs: Not on file   Physical Activity: Not on file   Stress: Not on file   Social Connections: Not on file   Housing Stability: Not on file        Allergies: Patient has no known allergies.     Medications:   Current Outpatient Medications   Medication Sig Dispense Refill     Potassium Chloride ER 20 MEQ Oral Tab CR Take 20 mEq by mouth daily. 5 tablet 0    budesonide 0.5 MG/2ML Inhalation Suspension Take 2 mL (0.5 mg total) by nebulization daily. 120 mL 5    benzonatate 200 MG Oral Cap Take 1 capsule (200 mg total) by mouth 3 (three) times daily as needed. 30 capsule 0    guaiFENesin-codeine 100-10 MG/5ML Oral Solution Take 5 mL by mouth nightly as needed for cough. 180 mL 0    Respiratory Therapy Supplies (NEBULIZER/TUBING/MOUTHPIECE) Does not apply Kit To use for Neb treatments 1 each 0    fluticasone propionate 50 MCG/ACT Nasal Suspension 1 spray by Each Nare route daily. 11.1 mL 0    predniSONE 20 MG Oral Tab Take 2 tablets (40 mg total) by mouth daily for 2 days, THEN 1 tablet (20 mg total) daily for 2 days, THEN 0.5 tablets (10 mg total) daily for 2 days. 7 tablet 0    ipratropium-albuterol 0.5-2.5 (3) MG/3ML Inhalation Solution Take 3 mL by nebulization every 6 (six) hours as needed. 84 mL 1    azithromycin 250 MG Oral Tab Take 2 tablets (500 mg total) by mouth daily for 1 day, THEN 1 tablet (250 mg total) daily for 4 days. 6 tablet 0    albuterol 108 (90 Base) MCG/ACT Inhalation Aero Soln Inhale 2 puffs into the lungs every 4 (four) hours as needed for Wheezing. 1 each 0       Review of Systems: Review of Systems    Physical Exam:  Pulse 78   Resp 16   Ht 5' 2\" (1.575 m)   Wt 179 lb (81.2 kg)   SpO2 98%   BMI 32.74 kg/m²      Constitutional: alert, cooperative. No acute distress.  HEENT: Head NC/AT. Significant erythema, inflammed turbnates bilaterallt  Mallampati 3+  Cardio: Regular rate and rhythm. Normal S1 and S2. No murmurs.   Respiratory: Thorax symmetrical with no labored breathing. rhonchi bilaterally  GI: NABS. Abd soft, non-tender.  Extremities: No clubbing or cyanosis. No BLE edema.    Neurologic: A&Ox3. No gross motor deficits.  Skin: Warm, dry  Psych: Calm, cooperative. Pleasant affect.    Results:  Personally reviewed  WBC: 8.1, done on 9/19/2024.  HGB:  14.9, done on 9/19/2024.  PLT: 238, done on 9/19/2024.     Glucose: 93, done on 9/19/2024.  Cr: 0.83, done on 9/19/2024.  Last eGFR was 80 on 9/19/2024.  CA: 9.9, done on 9/19/2024.  Na: 141, done on 9/19/2024.  K: 3.1, done on 9/19/2024.  Cl: 104, done on 9/19/2024.  CO2: 29, done on 9/19/2024.  Last ALB was 4.3% done on 9/19/2024.     XR CHEST PA + LAT CHEST (CPT=71046)    Result Date: 9/19/2024  CONCLUSION:  1. Lungs are clear without acute cardiopulmonary disease. 2. Stable borderline cardiomegaly.   LOCATION:  [facility]   Dictated by (CST): Marilin Sagastume DO on 9/19/2024 at 5:05 PM     Finalized by (CST): Marilin Sagastume DO on 9/19/2024 at 5:06 PM       CTA CHEST (CPT=71275)    Result Date: 9/11/2024  CONCLUSION:  1. No CT evidence for pulmonary embolism. 2. Ascending thoracic aorta measures 4.2 cm.  Follow-up can be performed to ensure stability as clinically indicated. 3. Peribronchial thickening may represent bronchitis, correlate clinically. 4. Left lower lobe pleural based 5 mm nodule is nonspecific.  Follow-up as clinically indicated.  The findings include a single, incidentally detected, solid pulmonary nodule, measuring less than 6mm.  2017 guidelines from the Fleischner Society for the follow-up and management of incidentally detected indeterminate pulmonary nodules in persons at least 35 years of age depend on nodule size (average length and width) and underlying risk factors (including smoking and other risk factors). Please consider the following recommendations after clinical assessment of risk factors.  For <6mm solid nodules: In low risk patients, no follow-up required.  If suspicious morphology or upper lobe location, consider 12 month follow-up.  In high risk patients, optional CT in 12 months.     LOCATION:  [facility]   Dictated by (CST): Loretta Moore MD on 9/11/2024 at 10:45 AM     Finalized by (CST): Loretta Moore MD on 9/11/2024 at 10:51 AM       XR CHEST AP PORTABLE  (CPT=71045)    Result Date:  9/11/2024  CONCLUSION:   Heart size upper limits normal.  Mild diffuse interstitial and peribronchial thickening suggesting bronchitis or reactive airway disease/asthma.  Patchy left infrahilar opacity favors partial atelectasis.  No associated pleural effusion or pneumothorax.  LOCATION:  [facility]      Dictated by (CST): Shavonne Choudhary MD on 9/11/2024 at 9:08 AM     Finalized by (CST): Shavonne Choudhary MD on 9/11/2024 at 9:09 AM         Assessment/Plan:  #1. Viral bronchitis, possible asthma exacerbation?  Start budesoinde nebs  Continue flonase BID  Can stop albuterol for now  Would continue azithomycin and finish course  Will add on allergy screen  Plan to check PFTs when better    Return in about 4 weeks (around 10/18/2024).    Martin Preston MD  9/20/2024

## 2024-09-26 LAB — VITAMIN B6: 6 UG/L

## 2024-09-27 ENCOUNTER — TELEPHONE (OUTPATIENT)
Dept: INTERNAL MEDICINE CLINIC | Facility: CLINIC | Age: 59
End: 2024-09-27

## 2024-09-27 NOTE — TELEPHONE ENCOUNTER
See below.    Spoke to patient regarding below. Patient coughing all night. Not sleeping. Freq urination. Very difficult having stool, had bm yesterday after about 3 days from prior. Denies:cp, vomiting, abd pain, fever, lightheaded/dizziness, hematuria, . No more cold like symptoms/mucous. Back pain (upper/middle back, sometimes goes away, 1 mo, tolerable). Mild difficulty breathing, exp past month, not at rest, occurs while walk/talk/coughing, no wheezing.  Endorses past week noticed some blood in stool (2-3 times), no blood in stool yesterday, no hx of hemorrhoids, denies blood in toilet water just stool, thinks it from hard stool.     Advised needs to go to urgent care today for eval instead of waiting for appoinment Monday. Patient refusing stating she is too tired and waiting until Monday. Advised her condition can worsen and too long to wait. Advised uc today/ informed this is our rec and up to her to follow. Patient states she will go to uc tomorrow if she worsens. Fyi-thanks!    Future Appointments   Date Time Provider Department Center   9/30/2024  3:00 PM Stephy Lora MD [facility] [address] [place]

## 2024-09-27 NOTE — TELEPHONE ENCOUNTER
Rec message from . Called patient regarding below.    \"Appointment For: Sima Malloy ([id number])  Visit Type: MYCHART FOLLOW UP (3209)     9/30/2024    3:00 PM  30 mins.  Stephy Lora               [facility] [address]     Patient Comments:  Severe constipation   Cough   Back pain\"

## 2024-09-30 ENCOUNTER — OFFICE VISIT (OUTPATIENT)
Dept: INTERNAL MEDICINE CLINIC | Facility: CLINIC | Age: 59
End: 2024-09-30
Payer: MEDICAID

## 2024-09-30 VITALS
WEIGHT: 178 LBS | SYSTOLIC BLOOD PRESSURE: 126 MMHG | HEART RATE: 65 BPM | TEMPERATURE: 98 F | DIASTOLIC BLOOD PRESSURE: 76 MMHG | OXYGEN SATURATION: 98 % | RESPIRATION RATE: 17 BRPM | HEIGHT: 62 IN | BODY MASS INDEX: 32.76 KG/M2

## 2024-09-30 DIAGNOSIS — K59.00 CONSTIPATION, UNSPECIFIED CONSTIPATION TYPE: ICD-10-CM

## 2024-09-30 DIAGNOSIS — N30.01 ACUTE CYSTITIS WITH HEMATURIA: Primary | ICD-10-CM

## 2024-09-30 DIAGNOSIS — R05.2 SUBACUTE COUGH: ICD-10-CM

## 2024-09-30 DIAGNOSIS — E55.9 VITAMIN D DEFICIENCY: ICD-10-CM

## 2024-09-30 LAB
ALLERGEN BRAZIL NUT: <0.1 KUA/L (ref ?–0.1)
ALMOND IGE QN: <0.1 KUA/L (ref ?–0.1)
BILIRUB UR QL STRIP.AUTO: NEGATIVE
CASHEW NUT IGE QN: <0.1 KUA/L (ref ?–0.1)
CLAM IGE QN: <0.1 KUA/L (ref ?–0.1)
CLARITY UR REFRACT.AUTO: CLEAR
CODFISH IGE QN: <0.1 KUA/L (ref ?–0.1)
CORN IGE QN: <0.1 KUA/L (ref ?–0.1)
COW MILK IGE QN: <0.1 KUA/L (ref ?–0.1)
EGG WHITE IGE QN: 0.19 KUA/L (ref ?–0.1)
GLUCOSE UR STRIP.AUTO-MCNC: NORMAL MG/DL
GLUTEN IGE QN: <0.1 KUA/L (ref ?–0.1)
HAZELNUT IGE QN: <0.1 KUA/L (ref ?–0.1)
IGE SERPL-ACNC: 110 KU/L (ref 2–214)
KETONES UR STRIP.AUTO-MCNC: NEGATIVE MG/DL
LEUKOCYTE ESTERASE UR QL STRIP.AUTO: 500
NITRITE UR QL STRIP.AUTO: NEGATIVE
PEANUT IGE QN: <0.1 KUA/L (ref ?–0.1)
PH UR STRIP.AUTO: 6 [PH] (ref 5–8)
RBC #/AREA URNS AUTO: >10 /HPF
SALMON IGE QN: <0.1 KUA/L (ref ?–0.1)
SCALLOP IGE QN: <0.1 KUA/L (ref ?–0.1)
SESAME SEED IGE QN: <0.1 KUA/L (ref ?–0.1)
SHRIMP IGE QN: 0.3 KUA/L (ref ?–0.1)
SOYBEAN IGE QN: <0.1 KUA/L (ref ?–0.1)
SP GR UR STRIP.AUTO: 1.02 (ref 1–1.03)
UROBILINOGEN UR STRIP.AUTO-MCNC: NORMAL MG/DL
WALNUT IGE QN: <0.1 KUA/L (ref ?–0.1)
WBC #/AREA URNS AUTO: >50 /HPF
WBC CLUMPS UR QL AUTO: PRESENT /HPF
WHEAT IGE QN: <0.1 KUA/L (ref ?–0.1)

## 2024-09-30 PROCEDURE — 99214 OFFICE O/P EST MOD 30 MIN: CPT | Performed by: STUDENT IN AN ORGANIZED HEALTH CARE EDUCATION/TRAINING PROGRAM

## 2024-09-30 PROCEDURE — 87086 URINE CULTURE/COLONY COUNT: CPT | Performed by: STUDENT IN AN ORGANIZED HEALTH CARE EDUCATION/TRAINING PROGRAM

## 2024-09-30 PROCEDURE — 87186 SC STD MICRODIL/AGAR DIL: CPT | Performed by: STUDENT IN AN ORGANIZED HEALTH CARE EDUCATION/TRAINING PROGRAM

## 2024-09-30 PROCEDURE — 81001 URINALYSIS AUTO W/SCOPE: CPT | Performed by: STUDENT IN AN ORGANIZED HEALTH CARE EDUCATION/TRAINING PROGRAM

## 2024-09-30 PROCEDURE — 87088 URINE BACTERIA CULTURE: CPT | Performed by: STUDENT IN AN ORGANIZED HEALTH CARE EDUCATION/TRAINING PROGRAM

## 2024-09-30 RX ORDER — LEVOFLOXACIN 500 MG/1
500 TABLET, FILM COATED ORAL DAILY
Qty: 5 TABLET | Refills: 0 | Status: SHIPPED | OUTPATIENT
Start: 2024-09-30

## 2024-09-30 RX ORDER — ACETAMINOPHEN 500 MG
1 TABLET ORAL DAILY
Qty: 30 CAPSULE | Refills: 0 | Status: SHIPPED | OUTPATIENT
Start: 2024-09-30

## 2024-09-30 RX ORDER — POLYETHYLENE GLYCOL 3350 17 G/17G
17 POWDER, FOR SOLUTION ORAL DAILY
Qty: 30 EACH | Refills: 0 | Status: SHIPPED | OUTPATIENT
Start: 2024-09-30

## 2024-09-30 RX ORDER — CHOLECALCIFEROL (VITAMIN D3) 50 MCG
1 TABLET ORAL DAILY
COMMUNITY
Start: 2024-09-30

## 2024-09-30 RX ORDER — AMOXICILLIN 250 MG
1 CAPSULE ORAL DAILY
Qty: 60 TABLET | Refills: 0 | Status: SHIPPED | OUTPATIENT
Start: 2024-09-30

## 2024-09-30 NOTE — PATIENT INSTRUCTIONS
Increase your hydration, be active, eat fruits and veggies, drink hot decaf liquids. If no improvement start metamucil. If no improvement start mirilax. If still no improvement do prune punch (4oz of orange juice, 4 oz of prune juice heated in the microwave together and add a dose of milk of magnesia from over the counter) daily.       Constipation (Adult)  Constipation means that you have bowel movements that are less frequent than usual. Stools often become very hard and difficult to pass.  Constipation is very common. At some point in life, it affects almost everyone. Since everyone's bowel habits are different, what is constipation to one person may not be to another. Your healthcare provider may do tests to diagnose constipation. It depends on what he or she finds when evaluating you.    Symptoms of constipation include:  Abdominal pain  Bloating  Vomiting  Painful bowel movements  Itching, swelling, bleeding, or pain around the anus  Causes  Constipation can have many causes. These include:  Diet low in fiber  Too much dairy  Not drinking enough liquids  Lack of exercise or physical activity (especially true for older adults)  Changes in lifestyle or daily routine, including pregnancy, aging, work, and travel  Frequent use or misuse of laxatives  Ignoring the urge to have a bowel movement or delaying it until later  Medicines, such as certain prescription pain medicines, iron supplements, antacids, certain antidepressants, and calcium supplements  Diseases like irritable bowel syndrome, bowel obstructions, stroke, diabetes, thyroid disease, Parkinson disease, hemorrhoids, and colon cancer  Complications  Potential complications of constipation can include:  Hemorrhoids  Rectal bleeding from hemorrhoids or anal fissures (skin tears)  Hernias  Dependency on laxatives  Chronic constipation  Fecal impaction, a severe form of constipation in which a large amount of hard stool is in your rectum that you can't  pass  Bowel obstruction or perforation  Home care  All treatment should be done after talking with your healthcare provider. This is especially true if you have another medical problems, are taking prescription medicines, or are an older adult. Treatment most often involves lifestyle changes. You may also need medicines. Your healthcare provider will tell you which will work best for you. Follow the advice below to help avoid this problem in the future.  Lifestyle changes  These lifestyle changes can help prevent constipation:  Diet. Eat a high-fiber diet, with fresh fruit and vegetables, and reduce dairy intake, meats, and processed foods  Fluids. It's important to get enough fluids each day. Drink plenty of water when you eat more fiber. If you are on diet that limits the amount of fluid you can have, talk about this with your healthcare provider.  Regular exercise. Check with your healthcare provider first.  Medicines  Take any medicines as directed. Some laxatives are safe to use only every now and then. Others can be taken on a regular basis. While laxatives don't cause bowel dependence, they are treating the symptoms. So your constipation may return if you don't make other changes. Talk with your healthcare provider or pharmacist if you have questions.  Prescription pain medicines can cause constipation. If you are taking this kind of medicine, ask your healthcare provider if you should also take a stool softener.  Medicines you may take to treat constipation include:  Fiber supplements  Stool softeners  Laxatives  Enemas  Rectal suppositories  Follow-up care  Follow up with your healthcare provider if symptoms don't get better in the next few days. You may need to have more tests or see a specialist.  Call 911  Call 911 if any of these occur:  Trouble breathing  Stiff, rigid abdomen that is severely painful to touch  Confusion  Fainting or loss of consciousness  Rapid heart rate  Chest pain  When to seek  medical advice  Call your healthcare provider right away if any of these occur:  Fever of 100.4°F (38°C) or higher, or as directed by your healthcare provider  Failure to resume normal bowel movements  Pain in your abdomen or back gets worse  Nausea or vomiting  Swelling in your abdomen  Blood in the stool  Black, tarry stool  Involuntary weight loss  Weakness  Nancy last reviewed this educational content on 6/1/2018  © 8954-9195 [employer]. All rights reserved. This information is not intended as a substitute for professional medical care. Always follow your healthcare professional's instructions.

## 2024-09-30 NOTE — PROGRESS NOTES
OFFICE NOTE     Patient ID: Sima Malloy is a 59 year old female.  Today's Date: 09/30/24  Chief Complaint: Cough (Cough 1x month ) and Constipation (Symptom started week ago )      Patient comes in today with c/o continued cough, however slightly better from prior visit. Completed Azithromycin and prednisone. Is not doing nebs as was ordered , may be once a day if she remembers.  She also c/o worsening Urinary frequency and urgency with burning on urination.     Notes Constipation : Taking methamucil daily , however still has a problem. BM every every 2 days, hard, no blood, brown color      Vitals:    09/30/24 1526   BP: 126/76   Pulse: 65   Resp: 17   Temp: 97.8 °F (36.6 °C)   SpO2: 98%   Weight: 178 lb (80.7 kg)   Height: 5' 2\" (1.575 m)     body mass index is 32.56 kg/m².  BP Readings from Last 3 Encounters:   09/30/24 126/76   09/19/24 128/80   09/11/24 135/78     The 10-year ASCVD risk score (Aubrie PIMENTEL, et al., 2019) is: 3.6%    Values used to calculate the score:      Age: 59 years      Sex: Female      Is Non- : No      Diabetic: No      Tobacco smoker: No      Systolic Blood Pressure: 126 mmHg      Is BP treated: No      HDL Cholesterol: 51 mg/dL      Total Cholesterol: 189 mg/dL      Medications reviewed:  Current Outpatient Medications   Medication Sig Dispense Refill    sennosides-docusate (COLACE 2-IN-1) 8.6-50 MG Oral Tab Take 1 tablet by mouth daily. 60 tablet 0    Polyethylene Glycol 3350 (MIRALAX) 17 g Oral Powd Pack Take 17 g by mouth daily. 30 each 0    levoFLOXacin 500 MG Oral Tab Take 1 tablet (500 mg total) by mouth daily. 5 tablet 0    Cholecalciferol (VITAMIN D) 50 MCG (2000 UT) Oral Tab Take 1 capsule by mouth daily.      Probiotic, Lactobacillus, Oral Cap Take 1 capsule by mouth daily. 30 capsule 0    Potassium Chloride ER 20 MEQ Oral Tab CR Take 20 mEq by mouth daily. 5 tablet 0    budesonide 0.5 MG/2ML Inhalation Suspension Take 2 mL (0.5 mg total) by  nebulization daily. 120 mL 5    benzonatate 200 MG Oral Cap Take 1 capsule (200 mg total) by mouth 3 (three) times daily as needed. 30 capsule 0    guaiFENesin-codeine 100-10 MG/5ML Oral Solution Take 5 mL by mouth nightly as needed for cough. 180 mL 0    Respiratory Therapy Supplies (NEBULIZER/TUBING/MOUTHPIECE) Does not apply Kit To use for Neb treatments 1 each 0    fluticasone propionate 50 MCG/ACT Nasal Suspension 1 spray by Each Nare route daily. 11.1 mL 0    ipratropium-albuterol 0.5-2.5 (3) MG/3ML Inhalation Solution Take 3 mL by nebulization every 6 (six) hours as needed. 84 mL 1    albuterol 108 (90 Base) MCG/ACT Inhalation Aero Soln Inhale 2 puffs into the lungs every 4 (four) hours as needed for Wheezing. 1 each 0         Assessment & Plan    1. Acute cystitis with hematuria (Primary)  Last visit Urine was positive for ESBL 10,000 - 50,000 CFU/ml. Her urinary symptoms are getting worse compared to prior visit, and she would benefit from addition of antibiotics.   Will send urine for analysis and culture again  -     levoFLOXacin; Take 1 tablet (500 mg total) by mouth daily.  Dispense: 5 tablet; Refill: 0  -     Probiotic (Lactobacillus); Take 1 capsule by mouth daily.  Dispense: 30 capsule; Refill: 0  -     Urinalysis, Routine; Future; Expected date: 09/30/2024  -     Urine Culture, Routine; Future; Expected date: 09/30/2024    2. Constipation, unspecified constipation type  Patient should continue metamucil. Will add senna s and as needed Miralax for constipation >2 days  -     Sennosides-Docusate Sodium; Take 1 tablet by mouth daily.  Dispense: 60 tablet; Refill: 0  -     Polyethylene Glycol 3350; Take 17 g by mouth daily.  Dispense: 30 each; Refill: 0    3. Vitamin D deficiency  Should start supplementation with vitamin D 2000 Units daily    4. Subacute cough  She is still coughing. Is not doing nebs as prescribed.   Recommend to do neb treatments TID around the clock  - Will start Trelegy inhaler (  sample provided)    Follow Up: As needed/if symptoms worsen     There is a longitudinal care relationship with me, the care plan reflects the ongoing nature of the continuous relationship of care, and the medical record indicates that there is ongoing treatment of a serious/complex medical condition which I am currently managing.  is Applicable.     Objective/ Results:   Physical Exam  Constitutional:       General: She is not in acute distress.     Appearance: Normal appearance. She is obese. She is not ill-appearing or toxic-appearing.   HENT:      Head: Normocephalic and atraumatic.      Mouth/Throat:      Pharynx: No oropharyngeal exudate or posterior oropharyngeal erythema.   Eyes:      Extraocular Movements: Extraocular movements intact.      Conjunctiva/sclera: Conjunctivae normal.      Pupils: Pupils are equal, round, and reactive to light.   Cardiovascular:      Rate and Rhythm: Normal rate and regular rhythm.      Pulses: Normal pulses.      Heart sounds: Normal heart sounds. No murmur heard.  Pulmonary:      Effort: Pulmonary effort is normal. No respiratory distress.      Breath sounds: Normal breath sounds. No stridor. No wheezing or rales.   Abdominal:      General: Abdomen is flat. Bowel sounds are normal. There is no distension.      Palpations: Abdomen is soft. There is no mass.      Tenderness: There is no abdominal tenderness. There is no right CVA tenderness, left CVA tenderness, guarding or rebound.      Hernia: No hernia is present.   Musculoskeletal:      Cervical back: Normal range of motion.      Right lower leg: No edema.      Left lower leg: No edema.   Lymphadenopathy:      Cervical: No cervical adenopathy.   Skin:     General: Skin is warm.      Capillary Refill: Capillary refill takes less than 2 seconds.      Coloration: Skin is not jaundiced.      Findings: No bruising.   Neurological:      General: No focal deficit present.      Mental Status: She is alert and oriented to  person, place, and time.      Cranial Nerves: No cranial nerve deficit.   Psychiatric:         Mood and Affect: Mood normal.         Behavior: Behavior normal.         Thought Content: Thought content normal.         Judgment: Judgment normal.        Reviewed:    Patient Active Problem List    Diagnosis    Ascending aorta dilatation (HCC)    PVC's (premature ventricular contractions)    Vitamin D deficiency    Memory changes      No Known Allergies     Social History     Socioeconomic History    Marital status:    Tobacco Use    Smoking status: Never     Passive exposure: Never    Smokeless tobacco: Never   Vaping Use    Vaping status: Never Used   Substance and Sexual Activity    Alcohol use: Never    Drug use: Never      Review of Systems   Constitutional: Negative.    HENT: Negative.     Eyes: Negative.    Respiratory:  Positive for cough.    Gastrointestinal:  Positive for constipation.   Endocrine: Negative.    Genitourinary:  Positive for dysuria, frequency and urgency.   Musculoskeletal: Negative.    Skin: Negative.    Neurological: Negative.    Hematological: Negative.    Psychiatric/Behavioral: Negative.       All other systems negative unless otherwise stated in ROS or HPI above.     30 minutes spent on provision of medical services today, including chart review, obtaining and reviewing history, performing medically appropriate examination, counseling and educating patient and/or caregiver, ordering testing , medications, referrals or procedures, my independent interpretation of results, communication of results to patient and /or caregiver, care coordination, and documentation of all of the above.     Stephy Lora MD  Internal Medicine       Call office with any questions or seek emergency care if necessary.   Patient understands and agrees to follow directions and advice.      ----------------------------------------- PATIENT INSTRUCTIONS-----------------------------------------     Patient  Instructions       Increase your hydration, be active, eat fruits and veggies, drink hot decaf liquids. If no improvement start metamucil. If no improvement start mirilax. If still no improvement do prune punch (4oz of orange juice, 4 oz of prune juice heated in the microwave together and add a dose of milk of magnesia from over the counter) daily.       Constipation (Adult)  Constipation means that you have bowel movements that are less frequent than usual. Stools often become very hard and difficult to pass.  Constipation is very common. At some point in life, it affects almost everyone. Since everyone's bowel habits are different, what is constipation to one person may not be to another. Your healthcare provider may do tests to diagnose constipation. It depends on what he or she finds when evaluating you.    Symptoms of constipation include:  Abdominal pain  Bloating  Vomiting  Painful bowel movements  Itching, swelling, bleeding, or pain around the anus  Causes  Constipation can have many causes. These include:  Diet low in fiber  Too much dairy  Not drinking enough liquids  Lack of exercise or physical activity (especially true for older adults)  Changes in lifestyle or daily routine, including pregnancy, aging, work, and travel  Frequent use or misuse of laxatives  Ignoring the urge to have a bowel movement or delaying it until later  Medicines, such as certain prescription pain medicines, iron supplements, antacids, certain antidepressants, and calcium supplements  Diseases like irritable bowel syndrome, bowel obstructions, stroke, diabetes, thyroid disease, Parkinson disease, hemorrhoids, and colon cancer  Complications  Potential complications of constipation can include:  Hemorrhoids  Rectal bleeding from hemorrhoids or anal fissures (skin tears)  Hernias  Dependency on laxatives  Chronic constipation  Fecal impaction, a severe form of constipation in which a large amount of hard stool is in your rectum  that you can't pass  Bowel obstruction or perforation  Home care  All treatment should be done after talking with your healthcare provider. This is especially true if you have another medical problems, are taking prescription medicines, or are an older adult. Treatment most often involves lifestyle changes. You may also need medicines. Your healthcare provider will tell you which will work best for you. Follow the advice below to help avoid this problem in the future.  Lifestyle changes  These lifestyle changes can help prevent constipation:  Diet. Eat a high-fiber diet, with fresh fruit and vegetables, and reduce dairy intake, meats, and processed foods  Fluids. It's important to get enough fluids each day. Drink plenty of water when you eat more fiber. If you are on diet that limits the amount of fluid you can have, talk about this with your healthcare provider.  Regular exercise. Check with your healthcare provider first.  Medicines  Take any medicines as directed. Some laxatives are safe to use only every now and then. Others can be taken on a regular basis. While laxatives don't cause bowel dependence, they are treating the symptoms. So your constipation may return if you don't make other changes. Talk with your healthcare provider or pharmacist if you have questions.  Prescription pain medicines can cause constipation. If you are taking this kind of medicine, ask your healthcare provider if you should also take a stool softener.  Medicines you may take to treat constipation include:  Fiber supplements  Stool softeners  Laxatives  Enemas  Rectal suppositories  Follow-up care  Follow up with your healthcare provider if symptoms don't get better in the next few days. You may need to have more tests or see a specialist.  Call 911  Call 911 if any of these occur:  Trouble breathing  Stiff, rigid abdomen that is severely painful to touch  Confusion  Fainting or loss of consciousness  Rapid heart rate  Chest  pain  When to seek medical advice  Call your healthcare provider right away if any of these occur:  Fever of 100.4°F (38°C) or higher, or as directed by your healthcare provider  Failure to resume normal bowel movements  Pain in your abdomen or back gets worse  Nausea or vomiting  Swelling in your abdomen  Blood in the stool  Black, tarry stool  Involuntary weight loss  Weakness  Nancy last reviewed this educational content on 6/1/2018  © 8714-9541 [employer]. All rights reserved. This information is not intended as a substitute for professional medical care. Always follow your healthcare professional's instructions.                  The 21st Century Cures Act makes medical notes available to patients in the interest of transparency.  However, please be advised that this is a medical document.  It is intended as a peer to peer communication.  It is written in medical language and may contain abbreviations or verbiage that are technical and unfamiliar.  It may appear blunt or direct.  Medical documents are intended to carry relevant information, facts as evident, and the clinical opinion of the practitioner.

## 2024-10-08 ENCOUNTER — TELEPHONE (OUTPATIENT)
Dept: INTERNAL MEDICINE CLINIC | Facility: CLINIC | Age: 59
End: 2024-10-08

## 2024-10-08 NOTE — TELEPHONE ENCOUNTER
Patient notified. Patient verbalized understanding   Appointment made   Future Appointments   Date Time Provider Department Center   10/9/2024  3:00 PM [facility] NURSE [facility] [place]

## 2024-10-09 ENCOUNTER — NURSE ONLY (OUTPATIENT)
Dept: INTERNAL MEDICINE CLINIC | Facility: CLINIC | Age: 59
End: 2024-10-09
Payer: MEDICAID

## 2024-10-09 PROCEDURE — 90746 HEPB VACCINE 3 DOSE ADULT IM: CPT | Performed by: STUDENT IN AN ORGANIZED HEALTH CARE EDUCATION/TRAINING PROGRAM

## 2024-10-09 PROCEDURE — 90471 IMMUNIZATION ADMIN: CPT | Performed by: STUDENT IN AN ORGANIZED HEALTH CARE EDUCATION/TRAINING PROGRAM

## 2024-10-10 ENCOUNTER — TELEPHONE (OUTPATIENT)
Dept: INTERNAL MEDICINE CLINIC | Facility: CLINIC | Age: 59
End: 2024-10-10

## 2024-10-10 ENCOUNTER — TELEPHONE (OUTPATIENT)
Facility: CLINIC | Age: 59
End: 2024-10-10

## 2024-10-10 NOTE — TELEPHONE ENCOUNTER
Should call Pulmonology if cough is not better.     She should also do all the recommendations as was ordered, including nebulizer treatments    Should do Shingles vaccine after cough improves

## 2024-10-10 NOTE — TELEPHONE ENCOUNTER
Attempt made to call patient's home and mobile number. Attempt made to leave recorded message. Mail box full. [employer] message sent to patient making aware Dr. Preston is not in the office tomorrow. Advised to contact PCP again, if unable to see patient. Advised for patient to go to immediate care for treatment.

## 2024-10-10 NOTE — TELEPHONE ENCOUNTER
Patients son, Arnoldo [phone] , states patients cough has gotten worse and would like to know what is recommended for patient to do about it.

## 2024-10-10 NOTE — TELEPHONE ENCOUNTER
Informed of below. Aware to call pulm. Aware to at least do two time a day neb prescribed by our office in addition to one by pulm. Patient already got flu, covid, and shingrix (all at pharmacy), with worsening cough, in addition to hep b in our office. Endorses her cough worse couple days and nasal drainage. Patient aware to contact pulm Fyi-thanks!

## 2024-10-30 ENCOUNTER — TELEPHONE (OUTPATIENT)
Dept: ORTHOPEDICS CLINIC | Facility: CLINIC | Age: 59
End: 2024-10-30

## 2024-10-30 ENCOUNTER — PATIENT MESSAGE (OUTPATIENT)
Dept: INTERNAL MEDICINE CLINIC | Facility: CLINIC | Age: 59
End: 2024-10-30

## 2024-10-30 DIAGNOSIS — M54.50 LOW BACK PAIN, UNSPECIFIED BACK PAIN LATERALITY, UNSPECIFIED CHRONICITY, UNSPECIFIED WHETHER SCIATICA PRESENT: Primary | ICD-10-CM

## 2024-10-30 NOTE — TELEPHONE ENCOUNTER
Future Appointments   Date Time Provider Department Center   10/31/2024  1:40 PM BK [facility] RM1 [facility] [address]   11/1/2024 10:00 AM Sudhakar Vasquez DO [facility] [id number]   11/4/2024 10:20 AM Flaco Craft MD [facility] ORTHO [facility] [address]   11/9/2024 10:30 AM Tomasa Briggs MD [facility] OB/GYN M [facility] [place]   11/9/2024  2:30 PM LMB CT RM1 LMB MOB. CT [facility] [place]     Please advise if patient needs lower back xrays.

## 2024-10-31 ENCOUNTER — HOSPITAL ENCOUNTER (OUTPATIENT)
Dept: MAMMOGRAPHY | Age: 59
Discharge: HOME | End: 2024-10-31
Attending: STUDENT IN AN ORGANIZED HEALTH CARE EDUCATION/TRAINING PROGRAM
Payer: MEDICAID

## 2024-10-31 DIAGNOSIS — Z12.31 SCREENING MAMMOGRAM FOR BREAST CANCER: ICD-10-CM

## 2024-10-31 PROCEDURE — 77063 BREAST TOMOSYNTHESIS BI: CPT | Performed by: STUDENT IN AN ORGANIZED HEALTH CARE EDUCATION/TRAINING PROGRAM

## 2024-10-31 PROCEDURE — 77067 SCR MAMMO BI INCL CAD: CPT | Performed by: STUDENT IN AN ORGANIZED HEALTH CARE EDUCATION/TRAINING PROGRAM

## 2024-10-31 NOTE — TELEPHONE ENCOUNTER
Future Appointments   Date Time Provider Department Center   10/31/2024  1:40 PM BK [facility] RM1 [facility] [address]   11/1/2024 10:00 AM Sudhakar Vasquez DO [facility] [id number]   11/4/2024 10:00 AM NAP XR RM2 NAP XRAY [facility] [place]   11/4/2024 10:20 AM Flaco Craft MD [facility] ORTHO 75 [facility] [address]   11/9/2024 10:30 AM Tomasa Briggs MD [facility] OB/GYN M [facility] [place]   11/9/2024  2:30 PM LMB CT RM1 LMB MOB. CT [facility] [place]     Pt able to schedule with  next week!

## 2024-11-01 ENCOUNTER — OFFICE VISIT (OUTPATIENT)
Dept: NEUROLOGY | Facility: CLINIC | Age: 59
End: 2024-11-01
Payer: MEDICAID

## 2024-11-01 VITALS
RESPIRATION RATE: 17 BRPM | SYSTOLIC BLOOD PRESSURE: 131 MMHG | BODY MASS INDEX: 32 KG/M2 | HEART RATE: 81 BPM | WEIGHT: 176.94 LBS | DIASTOLIC BLOOD PRESSURE: 80 MMHG

## 2024-11-01 DIAGNOSIS — R41.3 MEMORY CHANGES: Primary | ICD-10-CM

## 2024-11-01 PROCEDURE — 99204 OFFICE O/P NEW MOD 45 MIN: CPT | Performed by: OTHER

## 2024-11-01 RX ORDER — DONEPEZIL HYDROCHLORIDE 5 MG/1
5 TABLET, FILM COATED ORAL NIGHTLY
Qty: 30 TABLET | Refills: 3 | Status: SHIPPED | OUTPATIENT
Start: 2024-11-01

## 2024-11-01 NOTE — PATIENT INSTRUCTIONS
After your visit at the [facility] today,  please direct any follow up questions or medication needs to the staff in our [place] office so that your concerns may be promptly addressed.  We are available through [facility] or at the numbers below:    The phone number is:   [phone] option #1    The fax number is:  [phone]    Your pharmacy should also send any requests electronically to the [place] office.  Refill policies:    Allow 2-3 business days for refills; controlled substances may take longer.  Contact your pharmacy at least 5 days prior to running out of medication and have them send an electronic request or submit request through the “request refill” option in your [facility] account.  Refills are not addressed on weekends; covering physicians do not authorize routine medications on weekends.  No narcotics or controlled substances are refilled after noon on Fridays or by on call physicians.  By law, narcotics must be electronically prescribed.  A 30 day supply with no refills is the maximum allowed.  If your prescription is due for a refill, you may be due for a follow up appointment.  To best provide you care, patients receiving routine medications need to be seen at least once a year.  Patients receiving narcotic/controlled substance medications need to be seen at least once every 3 months.  In the event that your preferred pharmacy does not have the requested medication in stock (e.g. Backordered), it is your responsibility to find another pharmacy that has the requested medication available.  We will gladly send a new prescription to that pharmacy at your request.    Scheduling Tests:    If your physician has ordered radiology tests such as MRI or CT scans, please contact Central Scheduling at [phone] right away to schedule the test.  Once scheduled, the [facility] Centralized Referral Team will work with your insurance carrier to obtain pre-certification or prior authorization.   Depending on your insurance carrier, approval may take 3-10 days.  It is highly recommended patients assure they have received an authorization before having a test performed.  If test is done without insurance authorization, patient may be responsible for the entire amount billed.      Precertification and Prior Authorizations:  If your physician has recommended that you have a procedure or additional testing performed the [facility] Centralized Referral Team will contact your insurance carrier to obtain pre-certification or prior authorization.    You are strongly encouraged to contact your insurance carrier to verify that your procedure/test has been approved and is a COVERED benefit.  Although the [facility] Centralized Referral Team does its due diligence, the insurance carrier gives the disclaimer that \"Although the procedure is authorized, this does not guarantee payment.\"    Ultimately the patient is responsible for payment.   Thank you for your understanding in this matter.  Paperwork Completion:  If you require FMLA or disability paperwork for your recovery, please make sure to either drop it off or have it faxed to our office at [phone]. Be sure the form has your name and date of birth on it.  The form will be faxed to our Forms Department and they will complete it for you.  There is a 25$ fee for all forms that need to be filled out.  Please be aware there is a 10-14 day turnaround time.  You will need to sign a release of information (TUSHAR) form if your paperwork does not come with one.  You may call the Forms Department with any questions at [phone].  Their fax number is [phone].

## 2024-11-01 NOTE — PROGRESS NOTES
Pt reports she began noticing memory loss 2 years ago, pt states a few years ago her \"B levels\" were off.  She reports after taking vitamins was better.    Pt's son reports he notices more issues with memory with stress.

## 2024-11-01 NOTE — H&P
Neurology H&P    Sima Malloy Patient Status:  No patient class for patient encounter    1965 MRN [id number]   Location [facility], [address] [place] [address] Attending No att. providers found   Hosp Day # 0 PCP Stephy Lora MD     Subjective:  Sima Malloy is a(n) 59 year old female who comes in neurology clinic for evaluation of memory loss. She comes with her son today. He states that she forgets names and conversations,. She states that she forgets things sometimes like peoples names. She forgets where she places items in the house. She had an aunt who had dementia at her age. Per son she sometimes does not know where she is. This happens only when she is stressed out. She lives with her son. She takes care of her own ADLs. Her son takes care of all the finances. She can take care of basic finances per son though. She denies any hallucinations or delusions. She does not drive. She states that she saw a neurologist in the past and \"had blood tests and an EEG and he told me that I had a vitamin B6 deficiency and then got injections and everything seemed better\". Per son she had memory loss at that time which was attributed to B6 deficiency and that she recovered after B6 replacement. She has a college degree in biology.  Per son his memory loss has been ongoing for about 2 years and is getting worse.    Current Medications:  Current Outpatient Medications   Medication Sig Dispense Refill    sennosides-docusate (COLACE 2-IN-1) 8.6-50 MG Oral Tab Take 1 tablet by mouth daily. 60 tablet 0    Polyethylene Glycol 3350 (MIRALAX) 17 g Oral Powd Pack Take 17 g by mouth daily. 30 each 0    levoFLOXacin 500 MG Oral Tab Take 1 tablet (500 mg total) by mouth daily. 5 tablet 0    Cholecalciferol (VITAMIN D) 50 MCG ( UT) Oral Tab Take 1 capsule by mouth daily.      Probiotic, Lactobacillus, Oral Cap Take 1 capsule by mouth daily. 30 capsule 0    Potassium Chloride ER 20 MEQ Oral Tab CR Take 20 mEq by mouth  daily. 5 tablet 0    budesonide 0.5 MG/2ML Inhalation Suspension Take 2 mL (0.5 mg total) by nebulization daily. 120 mL 5    benzonatate 200 MG Oral Cap Take 1 capsule (200 mg total) by mouth 3 (three) times daily as needed. 30 capsule 0    guaiFENesin-codeine 100-10 MG/5ML Oral Solution Take 5 mL by mouth nightly as needed for cough. 180 mL 0    Respiratory Therapy Supplies (NEBULIZER/TUBING/MOUTHPIECE) Does not apply Kit To use for Neb treatments 1 each 0    fluticasone propionate 50 MCG/ACT Nasal Suspension 1 spray by Each Nare route daily. 11.1 mL 0    ipratropium-albuterol 0.5-2.5 (3) MG/3ML Inhalation Solution Take 3 mL by nebulization every 6 (six) hours as needed. 84 mL 1       Problem List:  Patient Active Problem List   Diagnosis    Ascending aorta dilatation (HCC)    PVC's (premature ventricular contractions)    Vitamin D deficiency    Memory changes       PMHx:  No past medical history on file.    PSHx:  Past Surgical History:   Procedure Laterality Date    Removal gallbladder         SocHx:  Social History     Socioeconomic History    Marital status:    Tobacco Use    Smoking status: Never     Passive exposure: Never    Smokeless tobacco: Never   Vaping Use    Vaping status: Never Used   Substance and Sexual Activity    Alcohol use: Never    Drug use: Never       Family History:  Family History   Problem Relation Age of Onset    Stroke Father     Diabetes Mother     Diabetes Son     Diabetes Sister     Diabetes Brother            ROS:  10 point ROS completed and was negative, except for pertinent positive and negatives stated in subjective.    Objective/Physical Exam:    Vital Signs:  There were no vitals taken for this visit.    Gen: Awake and in no apparent distress  HEENT: moist mucus membranes  Neck: Supple  Cardiovascular: Regular rate and rhythm, no murmur  Pulm: CTAB  GI: non-tender, normal bowel sounds  Skin: normal, dry  Extremities: No clubbing or cyanosis      Neurologic:   MENTAL  STATUS:   MOCA: 11/1/24  Visuospatial/executive 3/5  Naming 3/3  Attention 4/6  Language 2/3  Abstraction 2/2  Delayed Recall 0/5  Orientation 6/6  Total: 20/30      CRANIAL NERVES II to XII: PERRLA, no ptosis or diplopia, EOM intact, facial sensation intact, strong eye closure, face is symmetric, no dysarthria, tongue midline,  no tongue fasciculations or atrophy, strong shoulder shrug.    MOTOR EXAMINATION: normal tone, no fasciculations, normal strength throughout in UEs and LEs      SENSORY EXAMINATION:  UE: intact to light touch, pinprick intact  LE: intact to light touch, pinprick intact    COORDINATION:  No dysmetria, or intention tremors     REFLEXES: 2+ at biceps, 2+ brachioradialis, 2+ at patella     GAIT: normal stance, normal gait but a little slow        Labs:       Imaging:  No CNS imaging to review    Assessment:  This is a 60 y/o female with reports of memory loss. Her MOCA was 20/30. Her exam is noted above. I will order an MRI of the brain and refer her for formal cognitive testing. She has a FH of Alzheimer's per patient and son in an aunt who was also in her 60's.  I will get an MRI of the brain.  Her labs have been reviewed.  B6 B12 and thyroid function are all within normal range.  I also gave her referral to neuropsychology for formal cognitive evaluation.  Given the fact that her MoCA was 20 out of 30 and the son reports that she seems to be worsening fairly quickly would like to start donepezil 5 mg daily.  Side effects were discussed in detail.  Likely has some mild cog impairment or mild early dementia.  With a family history of early onset Alzheimer's disease very possible this is Alzheimer's dementia and we can consider further testing if needed for this.      Plan:  Memory loss - MCI vs   - MOCA 11/1/24  - B6: 6  - B12: 436  - TSH: 3.75  - MRI Brain  - Neuropsychology referral given  - Start Donepezil 5mg  daily      Sudhakar Vasquez, DO  Neurology

## 2024-11-04 ENCOUNTER — HOSPITAL ENCOUNTER (OUTPATIENT)
Dept: GENERAL RADIOLOGY | Age: 59
Discharge: HOME | End: 2024-11-04
Attending: STUDENT IN AN ORGANIZED HEALTH CARE EDUCATION/TRAINING PROGRAM
Payer: MEDICAID

## 2024-11-04 ENCOUNTER — OFFICE VISIT (OUTPATIENT)
Dept: ORTHOPEDICS CLINIC | Facility: CLINIC | Age: 59
End: 2024-11-04
Payer: MEDICAID

## 2024-11-04 DIAGNOSIS — M54.50 ACUTE MIDLINE LOW BACK PAIN WITHOUT SCIATICA: Primary | ICD-10-CM

## 2024-11-04 DIAGNOSIS — N20.0 KIDNEY STONE: ICD-10-CM

## 2024-11-04 DIAGNOSIS — M54.50 LOW BACK PAIN, UNSPECIFIED BACK PAIN LATERALITY, UNSPECIFIED CHRONICITY, UNSPECIFIED WHETHER SCIATICA PRESENT: ICD-10-CM

## 2024-11-04 PROCEDURE — 72100 X-RAY EXAM L-S SPINE 2/3 VWS: CPT | Performed by: STUDENT IN AN ORGANIZED HEALTH CARE EDUCATION/TRAINING PROGRAM

## 2024-11-04 PROCEDURE — 99204 OFFICE O/P NEW MOD 45 MIN: CPT | Performed by: STUDENT IN AN ORGANIZED HEALTH CARE EDUCATION/TRAINING PROGRAM

## 2024-11-04 RX ORDER — MELOXICAM 7.5 MG/1
7.5 TABLET ORAL DAILY
Qty: 14 TABLET | Refills: 0 | Status: SHIPPED | OUTPATIENT
Start: 2024-11-04

## 2024-11-04 NOTE — H&P
[facility] - ORTHOPEDICS  [address], [address] [address]  [address] [place] [id number]  [phone]     NEW PATIENT VISIT - HISTORY AND PHYSICAL EXAMINATION     Name: Sima Malloy   MRN: [id number]  Date: 11/04/24       CC: low back pain    REFERRED BY: Stephy Lora MD    HPI:   Sima Malloy is a very pleasant 59 year old female who presents today for evaluation of back pain. The distribution of symptoms are: 100% backpain and 0% leg pain. The symptoms began 2 week(s) ago  after lifting boxes . Since the onset, the symptoms have remained the same. Patient feels pain is aggravated by activity, standing and improved by rest, walking. The patient reports no numbness and no weakness.  The symptom characteristics are as follows: He is a 59-year-old female with chronic low back pain with exacerbation of symptoms approximately 2 weeks ago.  The patient feels pain in her back and difficulty with standing up straight.  Symptoms improved with walking..     Prior spine surgery: none.    Bowel and bladder symptoms: absent.    The patient has not had issues with balance and/or hand dexterity problems such as changes in penmanship or the use of buttons or zippers.    Treatment up to this time has included:    Evaluation: PCP  NSAIDS: have not been prescribed  Narcotic use: None  Physical therapy: None  Spinal injections: None  Others:       PMH:   History reviewed. No pertinent past medical history.    PAST SURGICAL HX:  Past Surgical History:   Procedure Laterality Date    Removal gallbladder         FAMILY HX:  Family History   Problem Relation Age of Onset    Stroke Father     Diabetes Mother     Diabetes Son     Diabetes Sister     Diabetes Brother        ALLERGIES:  Patient has no known allergies.    MEDICATIONS:   Current Outpatient Medications   Medication Sig Dispense Refill    Meloxicam 7.5 MG Oral Tab Take 1 tablet (7.5 mg total) by mouth daily. 14 tablet 0     donepezil 5 MG Oral Tab Take 1 tablet (5 mg total) by mouth nightly. 30 tablet 3    sennosides-docusate (COLACE 2-IN-1) 8.6-50 MG Oral Tab Take 1 tablet by mouth daily. (Patient not taking: Reported on 11/1/2024) 60 tablet 0    Polyethylene Glycol 3350 (MIRALAX) 17 g Oral Powd Pack Take 17 g by mouth daily. 30 each 0    levoFLOXacin 500 MG Oral Tab Take 1 tablet (500 mg total) by mouth daily. (Patient not taking: Reported on 11/1/2024) 5 tablet 0    Cholecalciferol (VITAMIN D) 50 MCG (2000 UT) Oral Tab Take 1 capsule by mouth daily. (Patient not taking: Reported on 11/1/2024)      Probiotic, Lactobacillus, Oral Cap Take 1 capsule by mouth daily. (Patient not taking: Reported on 11/1/2024) 30 capsule 0    Potassium Chloride ER 20 MEQ Oral Tab CR Take 20 mEq by mouth daily. (Patient not taking: Reported on 11/1/2024) 5 tablet 0    budesonide 0.5 MG/2ML Inhalation Suspension Take 2 mL (0.5 mg total) by nebulization daily. (Patient not taking: Reported on 11/1/2024) 120 mL 5    benzonatate 200 MG Oral Cap Take 1 capsule (200 mg total) by mouth 3 (three) times daily as needed. (Patient not taking: Reported on 11/1/2024) 30 capsule 0    guaiFENesin-codeine 100-10 MG/5ML Oral Solution Take 5 mL by mouth nightly as needed for cough. (Patient not taking: Reported on 11/1/2024) 180 mL 0    Respiratory Therapy Supplies (NEBULIZER/TUBING/MOUTHPIECE) Does not apply Kit To use for Neb treatments (Patient not taking: Reported on 11/1/2024) 1 each 0    fluticasone propionate 50 MCG/ACT Nasal Suspension 1 spray by Each Nare route daily. (Patient not taking: Reported on 11/1/2024) 11.1 mL 0    ipratropium-albuterol 0.5-2.5 (3) MG/3ML Inhalation Solution Take 3 mL by nebulization every 6 (six) hours as needed. (Patient not taking: Reported on 11/1/2024) 84 mL 1       ROS: A comprehensive 14 point review of systems was performed and was negative aside from the aforementioned per history of present illness.    SOCIAL HX:  Social History      Tobacco Use    Smoking status: Never     Passive exposure: Never    Smokeless tobacco: Never   Substance Use Topics    Alcohol use: Never         PE:   There were no vitals filed for this visit.  Estimated body mass index is 32.36 kg/m² as calculated from the following:    Height as of 9/30/24: 5' 2\" (1.575 m).    Weight as of 11/1/24: 176 lb 14.7 oz (80.3 kg).    Physical Exam  Constitutional:       Appearance: Normal appearance.   HENT:      Head: Normocephalic and atraumatic.   Eyes:      Extraocular Movements: Extraocular movements intact.   Cardiovascular:      Pulses: Normal pulses. Skin warm and well perfused.  Pulmonary:      Effort: Pulmonary effort is normal. No respiratory distress.   Skin:     General: Skin is warm.   Psychiatric:         Mood and Affect: Mood normal.     Spine Exam:    Normal gait without difficulty  Able to heel, toe, tandem gait without difficulty  Level shoulders and hips in even stance    Restricted L-spine ROM    No tenderness to palpation of L-spine    Straight leg raise test: negative    Sustained clonus: negative    LE Strength: 5/5 IP QUAD TA EHL GSC  LE Sensation: normal in L2-S1 distribution  LE reflexes: normal    Radiographic Examination/Diagnostics:  XR personally viewed, independently interpreted and radiology report was reviewed.  X-ray of the lumbar spine demonstrates mild degenerative changes in the lumbar segments.  Possible T11 compression fracture of unknown chronicity    IMPRESSION: Sima Malloy is a 59 year old female with acute low back pain after lifting heavy boxes 2 weeks ago.  MRI suggestive of T11 compression fracture.    PLAN:   - MRI lumbar and thoracic spine to evaluate for chronicity of fracture and consider kyphoplasty  - Referred patient to PT for core strengthening     FOLLOW-UP:  We will see her back in follow-up in after updated images, or sooner if any problems arise. Patient understands and agrees with plan.      Flaco Craft MD  Orthopedic  Spine Surgeon  [facility]   [address], [address], [place] [address]   [address] [place] [address] [id number] [place] [address].[email or web address]  [email or web address]  t: [phone]   f: [phone]        This note was dictated using Dragon software.  While it was briefly proofread prior to completion, some grammatical, spelling, and word choice errors due to dictation may still occur.

## 2024-11-05 ENCOUNTER — TELEPHONE (OUTPATIENT)
Dept: ORTHOPEDICS CLINIC | Facility: CLINIC | Age: 59
End: 2024-11-05

## 2024-11-05 NOTE — TELEPHONE ENCOUNTER
Called to speak with Sima in regards to inform her that per Dr. Craft she will need to reschedule her MRI. He states that the MRI should be for her lumbar + thoracic and he would also for her to have a CT scan completed of her kidneys, in which he has also place an order for that as well.

## 2024-11-09 ENCOUNTER — HOSPITAL ENCOUNTER (OUTPATIENT)
Dept: CT IMAGING | Age: 59
Discharge: HOME | End: 2024-11-09
Attending: STUDENT IN AN ORGANIZED HEALTH CARE EDUCATION/TRAINING PROGRAM

## 2024-11-09 ENCOUNTER — OFFICE VISIT (OUTPATIENT)
Facility: CLINIC | Age: 59
End: 2024-11-09
Payer: MEDICAID

## 2024-11-09 VITALS
BODY MASS INDEX: 32.42 KG/M2 | WEIGHT: 176.19 LBS | HEIGHT: 62 IN | HEART RATE: 57 BPM | SYSTOLIC BLOOD PRESSURE: 130 MMHG | DIASTOLIC BLOOD PRESSURE: 80 MMHG

## 2024-11-09 DIAGNOSIS — Z12.4 SCREENING FOR MALIGNANT NEOPLASM OF CERVIX: ICD-10-CM

## 2024-11-09 DIAGNOSIS — I77.810 ASCENDING AORTA DILATATION (HCC): ICD-10-CM

## 2024-11-09 DIAGNOSIS — Z12.4 PAPANICOLAOU SMEAR FOR CERVICAL CANCER SCREENING: Primary | ICD-10-CM

## 2024-11-09 DIAGNOSIS — Z12.31 ENCOUNTER FOR SCREENING MAMMOGRAM FOR BREAST CANCER: ICD-10-CM

## 2024-11-09 PROCEDURE — 88342 IMHCHEM/IMCYTCHM 1ST ANTB: CPT | Performed by: OBSTETRICS & GYNECOLOGY

## 2024-11-09 PROCEDURE — 87624 HPV HI-RISK TYP POOLED RSLT: CPT | Performed by: OBSTETRICS & GYNECOLOGY

## 2024-11-09 PROCEDURE — 88305 TISSUE EXAM BY PATHOLOGIST: CPT | Performed by: OBSTETRICS & GYNECOLOGY

## 2024-11-09 PROCEDURE — 88175 CYTOPATH C/V AUTO FLUID REDO: CPT | Performed by: OBSTETRICS & GYNECOLOGY

## 2024-11-09 NOTE — PROGRESS NOTES
Sima Malloy is a 59 year old female  No LMP recorded. (Menstrual status: Menopause).   Chief Complaint   Patient presents with    Wellness Visit     Annual Exam   -Patient reports lower back pain for 10 days, has a hard time going from sitting to standing position    .     She has seen her primary for the back pain and was told it was muscular she is on medicine for it.  She complains of constipation she has not had a colonoscopy information for physicians to do them was given.  Blood work is done with her primary.  She denies vaginal bleeding she says she stopped having her periods when she was 43.  She said after she stopped having her period she had an ultrasound and was told that her uterus was enlarged    OBSTETRICS HISTORY:  OB History    Para Term  AB Living   4 4           SAB IAB Ectopic Multiple Live Births                  # Outcome Date GA Lbr Kj/2nd Weight Sex Type Anes PTL Lv   4 Para            3 Para            2 Para            1 Para                GYNE HISTORY:  Periods none due to menopause    History   Sexual Activity    Sexual activity: Not Currently        Pap Date:  (never had per patient)        MEDICAL HISTORY:  History reviewed. No pertinent past medical history.    SURGICAL HISTORY:  Past Surgical History:   Procedure Laterality Date    Appendectomy      Cholecystectomy      Removal gallbladder         SOCIAL HISTORY:  Social History     Socioeconomic History    Marital status:      Spouse name: Not on file    Number of children: Not on file    Years of education: Not on file    Highest education level: Not on file   Occupational History    Not on file   Tobacco Use    Smoking status: Never     Passive exposure: Never    Smokeless tobacco: Never   Vaping Use    Vaping status: Never Used   Substance and Sexual Activity    Alcohol use: Never    Drug use: Never    Sexual activity: Not Currently   Other Topics Concern     Service Not Asked    Blood Transfusions  Not Asked    Caffeine Concern No    Occupational Exposure Not Asked    Hobby Hazards Not Asked    Sleep Concern Not Asked    Stress Concern Not Asked    Weight Concern Not Asked    Special Diet Not Asked    Back Care Not Asked    Exercise No    Bike Helmet Not Asked    Seat Belt Not Asked    Self-Exams Not Asked   Social History Narrative    Not on file     Social Drivers of Health     Financial Resource Strain: Not on file   Food Insecurity: Not on file   Transportation Needs: Not on file   Physical Activity: Not on file   Stress: Not on file   Social Connections: Not on file   Housing Stability: Not on file       FAMILY HISTORY:  Family History   Problem Relation Age of Onset    Stroke Father     Diabetes Mother     Diabetes Son     Diabetes Sister     Diabetes Brother        MEDICATIONS:    Current Outpatient Medications:     Meloxicam 7.5 MG Oral Tab, Take 1 tablet (7.5 mg total) by mouth daily., Disp: 14 tablet, Rfl: 0    donepezil 5 MG Oral Tab, Take 1 tablet (5 mg total) by mouth nightly., Disp: 30 tablet, Rfl: 3    budesonide 0.5 MG/2ML Inhalation Suspension, Take 2 mL (0.5 mg total) by nebulization daily., Disp: 120 mL, Rfl: 5    ipratropium-albuterol 0.5-2.5 (3) MG/3ML Inhalation Solution, Take 3 mL by nebulization every 6 (six) hours as needed., Disp: 84 mL, Rfl: 1    sennosides-docusate (COLACE 2-IN-1) 8.6-50 MG Oral Tab, Take 1 tablet by mouth daily. (Patient not taking: Reported on 11/9/2024), Disp: 60 tablet, Rfl: 0    Polyethylene Glycol 3350 (MIRALAX) 17 g Oral Powd Pack, Take 17 g by mouth daily. (Patient not taking: Reported on 11/9/2024), Disp: 30 each, Rfl: 0    levoFLOXacin 500 MG Oral Tab, Take 1 tablet (500 mg total) by mouth daily. (Patient not taking: Reported on 11/9/2024), Disp: 5 tablet, Rfl: 0    Cholecalciferol (VITAMIN D) 50 MCG (2000 UT) Oral Tab, Take 1 capsule by mouth daily. (Patient not taking: Reported on 11/9/2024), Disp: , Rfl:     Probiotic, Lactobacillus, Oral Cap, Take 1  capsule by mouth daily. (Patient not taking: Reported on 11/9/2024), Disp: 30 capsule, Rfl: 0    Potassium Chloride ER 20 MEQ Oral Tab CR, Take 20 mEq by mouth daily. (Patient not taking: Reported on 11/9/2024), Disp: 5 tablet, Rfl: 0    benzonatate 200 MG Oral Cap, Take 1 capsule (200 mg total) by mouth 3 (three) times daily as needed. (Patient not taking: Reported on 11/9/2024), Disp: 30 capsule, Rfl: 0    guaiFENesin-codeine 100-10 MG/5ML Oral Solution, Take 5 mL by mouth nightly as needed for cough. (Patient not taking: Reported on 11/9/2024), Disp: 180 mL, Rfl: 0    Respiratory Therapy Supplies (NEBULIZER/TUBING/MOUTHPIECE) Does not apply Kit, To use for Neb treatments (Patient not taking: Reported on 11/9/2024), Disp: 1 each, Rfl: 0    fluticasone propionate 50 MCG/ACT Nasal Suspension, 1 spray by Each Nare route daily. (Patient not taking: Reported on 11/9/2024), Disp: 11.1 mL, Rfl: 0    ALLERGIES:  Allergies[1]      Review of Systems:  Constitutional:  Denies fatigue, night sweats, hot flashes  Gastrointestinal:  denies heartburn, abdominal pain, diarrhea or constipation  Genitourinary:  denies dysuria, incontinence, abnormal vaginal discharge, vaginal itching  Skin/Breast:  Denies any breast pain, lumps, or discharge.   Neurological:  denies headaches, extremity weakness or numbness.      PHYSICAL EXAM:   Constitutional: well developed, well nourished  Head/Face: normocephalic  Neck/Thyroid: thyroid symmetric, no thyromegaly, no nodules, no adenopathy  Breast: normal without palpable masses, tenderness, asymmetry, nipple discharge, nipple retraction or skin changes  Abdomen:  soft, nontender, nondistended, no masses  Skin/Hair: no unusual rashes or bruises   Extremities: no edema, no cyanosis  Psychiatric:  Oriented to time, place, person and situation. Appropriate mood and affect    Pelvic Exam:  External Genitalia: normal appearance, hair distribution, and no lesions  Urethral Meatus:  normal in size,  location, without lesions and prolapse  Bladder:  No fullness, masses or tenderness  Vagina:  Normal appearance without lesions, no abnormal discharge  Cervix:  Normal without tenderness on motion Pap was done.  There was about a 0.5 x 0.5 raised and irregular growth at the cervix at 12:00.  Uterus: normal in size, contour, position, mobility, without tenderness  Adnexa: normal without masses or tenderness  Perineum: normal  Anus: no hemorroids     Assessment & Plan:  There are no diagnoses linked to this encounter.    Well woman exam.  Pap.  Mammogram colonoscopy encouraged.  Cervical biopsy done               [1] No Known Allergies

## 2024-11-09 NOTE — PROGRESS NOTES
At 12:00 about 0.5 x 0.5 lesion was noted to be protruding from the cervix.  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  It was biopsied.  Sent to pathology.  Monsel's was placed.  Estimated blood loss minimal no complications tolerated procedure well

## 2024-11-14 LAB
.: NORMAL
.: NORMAL

## 2024-11-15 DIAGNOSIS — Z12.4 SCREENING FOR MALIGNANT NEOPLASM OF CERVIX: Primary | ICD-10-CM

## 2024-11-15 DIAGNOSIS — Z01.419 WELL WOMAN EXAM WITH ROUTINE GYNECOLOGICAL EXAM: ICD-10-CM

## 2024-11-18 LAB — HPV E6+E7 MRNA CVX QL NAA+PROBE: NEGATIVE

## 2024-11-20 ENCOUNTER — OFFICE VISIT (OUTPATIENT)
Dept: INTERNAL MEDICINE CLINIC | Facility: CLINIC | Age: 59
End: 2024-11-20
Payer: MEDICAID

## 2024-11-20 VITALS
HEART RATE: 53 BPM | TEMPERATURE: 97 F | SYSTOLIC BLOOD PRESSURE: 126 MMHG | DIASTOLIC BLOOD PRESSURE: 74 MMHG | WEIGHT: 176 LBS | RESPIRATION RATE: 16 BRPM | BODY MASS INDEX: 32.39 KG/M2 | HEIGHT: 62 IN | OXYGEN SATURATION: 96 %

## 2024-11-20 DIAGNOSIS — S22.089S CLOSED FRACTURE OF ELEVENTH THORACIC VERTEBRA, UNSPECIFIED FRACTURE MORPHOLOGY, SEQUELA: ICD-10-CM

## 2024-11-20 DIAGNOSIS — R53.83 FATIGUE, UNSPECIFIED TYPE: ICD-10-CM

## 2024-11-20 DIAGNOSIS — I77.810 ASCENDING AORTA DILATATION (HCC): ICD-10-CM

## 2024-11-20 DIAGNOSIS — R06.83 LOUD SNORING: Primary | ICD-10-CM

## 2024-11-20 PROCEDURE — 99214 OFFICE O/P EST MOD 30 MIN: CPT | Performed by: STUDENT IN AN ORGANIZED HEALTH CARE EDUCATION/TRAINING PROGRAM

## 2024-11-20 NOTE — PROGRESS NOTES
OFFICE NOTE     Patient ID: Sima Malloy is a 59 year old female.  Today's Date: 11/20/24  Chief Complaint: Lab Results (Calcium Score)    Patient is a 59-year-old female with PMH of ascending aorta dilation, PVCs, vitamin D deficiency, memory changes, pleural-based 5 mm nodule, who presents to the office today to discuss imaging findings on the cardiac overread.  She did have a known ascending aortic dilatation at 4.2 cm which has not changed since September CTA exam.  Her calcium score was 0.    She also complains of increased snoring during the night and severe fatigue during the day.  She is taking naps every day and usually wakes up with headaches. Patient's STOP-BANG questionnaire is 4 points (snoring loudly, feeling fatigue, apnea, over 50 years)    Recently seen Dr. Craft for acute back pain , patient had MRI, which was suggestive of T11 compression fracture. Was referred for PT and is considered for possible Kyphoplasty.      Vitals:    11/20/24 1655   BP: 126/74   Pulse: 53   Resp: 16   Temp: 97.3 °F (36.3 °C)   TempSrc: Temporal   SpO2: 96%   Weight: 176 lb (79.8 kg)   Height: 5' 2\" (1.575 m)     body mass index is 32.19 kg/m².  BP Readings from Last 3 Encounters:   11/20/24 126/74   11/09/24 130/80   11/01/24 131/80     The 10-year ASCVD risk score (Aubrie PIMENTEL, et al., 2019) is: 3.6%    Values used to calculate the score:      Age: 59 years      Sex: Female      Is Non- : No      Diabetic: No      Tobacco smoker: No      Systolic Blood Pressure: 126 mmHg      Is BP treated: No      HDL Cholesterol: 51 mg/dL      Total Cholesterol: 189 mg/dL      Medications reviewed:  Current Outpatient Medications   Medication Sig Dispense Refill    Meloxicam 7.5 MG Oral Tab Take 1 tablet (7.5 mg total) by mouth daily. (Patient taking differently: Take 1 tablet (7.5 mg total) by mouth daily as needed for Pain.) 14 tablet 0    donepezil 5 MG Oral Tab Take 1 tablet (5 mg total) by mouth  nightly. 30 tablet 3         Assessment & Plan    1. Loud snoring (Primary)  2. Fatigue, unspecified type  Patient with Intermediate risk for MARIANO. Will benefit from Sleep study.  Neck circumference 14 inches.  -     Diagnostic Sleep Study  -     General sleep study; Future; Expected date: 11/20/2024    3. Closed fracture of eleventh thoracic vertebra, unspecified fracture morphology, sequela  Will do dexa scan. Might need to start treatment for osteoporosis.  -     XR DEXA BONE DENSITOMETRY (CPT=77080); Future; Expected date: 11/20/2024    4. Ascending aorta dilatation (HCC)  Patient to complete the ECHO ordered previously as there was also heart enlargement seen on the CXR.   Will follow up on this in 6 month.       Follow Up: As needed/if symptoms worsen      There is a longitudinal care relationship with me, the care plan reflects the ongoing nature of the continuous relationship of care, and the medical record indicates that there is ongoing treatment of a serious/complex medical condition which I am currently managing.  is Applicable.     Objective/ Results:   Physical Exam  Constitutional:       General: She is not in acute distress.     Appearance: Normal appearance. She is obese. She is not ill-appearing or toxic-appearing.   HENT:      Head: Normocephalic and atraumatic.      Mouth/Throat:      Pharynx: No oropharyngeal exudate or posterior oropharyngeal erythema.   Eyes:      Extraocular Movements: Extraocular movements intact.      Conjunctiva/sclera: Conjunctivae normal.      Pupils: Pupils are equal, round, and reactive to light.   Cardiovascular:      Rate and Rhythm: Normal rate and regular rhythm.      Pulses: Normal pulses.      Heart sounds: Normal heart sounds. No murmur heard.  Pulmonary:      Effort: Pulmonary effort is normal. No respiratory distress.      Breath sounds: Normal breath sounds. No stridor. No wheezing or rales.   Musculoskeletal:      Cervical back: Normal range of motion.       Right lower leg: No edema.      Left lower leg: No edema.   Lymphadenopathy:      Cervical: No cervical adenopathy.   Skin:     General: Skin is warm.      Capillary Refill: Capillary refill takes less than 2 seconds.      Coloration: Skin is not jaundiced.      Findings: No bruising.   Neurological:      General: No focal deficit present.      Mental Status: She is alert and oriented to person, place, and time.      Cranial Nerves: No cranial nerve deficit.   Psychiatric:         Mood and Affect: Mood normal.         Behavior: Behavior normal.         Thought Content: Thought content normal.         Judgment: Judgment normal.        Reviewed:    Patient Active Problem List    Diagnosis    Closed T11 spinal fracture (HCC)    Fatigue    Ascending aorta dilatation (HCC)    PVC's (premature ventricular contractions)    Vitamin D deficiency    Memory changes      Allergies[1]     Social History     Socioeconomic History    Marital status:    Tobacco Use    Smoking status: Never     Passive exposure: Never    Smokeless tobacco: Never   Vaping Use    Vaping status: Never Used   Substance and Sexual Activity    Alcohol use: Never    Drug use: Never    Sexual activity: Not Currently   Other Topics Concern    Caffeine Concern No    Exercise No      Review of Systems   Constitutional:  Positive for fatigue (during the day and taking naps). Negative for fever.   HENT: Negative.     Eyes: Negative.    Respiratory:  Negative for cough, shortness of breath and wheezing.    Gastrointestinal:  Negative for abdominal pain, anal bleeding, constipation, diarrhea, nausea and vomiting.   Endocrine: Negative.    Genitourinary:  Negative for dysuria, frequency and urgency.   Musculoskeletal: Negative.    Skin: Negative.    Neurological:  Positive for headaches (Morning headaches).   Hematological: Negative.    Psychiatric/Behavioral: Negative.       All other systems negative unless otherwise stated in ROS or HPI above.     30  minutes spent on provision of medical services today, including chart review, obtaining and reviewing history, performing medically appropriate examination, counseling and educating patient and/or caregiver, ordering testing , medications, referrals or procedures, my independent interpretation of results, communication of results to patient and /or caregiver, care coordination, and documentation of all of the above.     Stephy Lora MD  Internal Medicine       Call office with any questions or seek emergency care if necessary.   Patient understands and agrees to follow directions and advice.      ----------------------------------------- PATIENT INSTRUCTIONS-----------------------------------------     There are no Patient Instructions on file for this visit.        The 21st Century Cures Act makes medical notes available to patients in the interest of transparency.  However, please be advised that this is a medical document.  It is intended as a peer to peer communication.  It is written in medical language and may contain abbreviations or verbiage that are technical and unfamiliar.  It may appear blunt or direct.  Medical documents are intended to carry relevant information, facts as evident, and the clinical opinion of the practitioner.        [1] No Known Allergies

## 2024-11-21 PROBLEM — R53.83 FATIGUE: Status: ACTIVE | Noted: 2024-11-21

## 2024-11-21 PROBLEM — S22.089A CLOSED T11 SPINAL FRACTURE (HCC): Status: ACTIVE | Noted: 2024-11-21

## 2024-11-22 ENCOUNTER — HOSPITAL ENCOUNTER (OUTPATIENT)
Dept: CT IMAGING | Age: 59
Discharge: HOME | End: 2024-11-22
Attending: STUDENT IN AN ORGANIZED HEALTH CARE EDUCATION/TRAINING PROGRAM
Payer: MEDICAID

## 2024-11-22 DIAGNOSIS — N20.0 KIDNEY STONE: ICD-10-CM

## 2024-11-22 PROCEDURE — 74176 CT ABD & PELVIS W/O CONTRAST: CPT | Performed by: STUDENT IN AN ORGANIZED HEALTH CARE EDUCATION/TRAINING PROGRAM

## 2024-12-16 ENCOUNTER — TELEPHONE (OUTPATIENT)
Dept: SLEEP CENTER | Age: 59
End: 2024-12-16

## 2024-12-17 ENCOUNTER — TELEPHONE (OUTPATIENT)
Dept: INTERNAL MEDICINE CLINIC | Facility: CLINIC | Age: 59
End: 2024-12-17

## 2024-12-21 ENCOUNTER — HOSPITAL ENCOUNTER (OUTPATIENT)
Dept: MRI IMAGING | Age: 59
Discharge: HOME | End: 2024-12-21
Attending: STUDENT IN AN ORGANIZED HEALTH CARE EDUCATION/TRAINING PROGRAM
Payer: MEDICAID

## 2024-12-21 ENCOUNTER — HOSPITAL ENCOUNTER (OUTPATIENT)
Dept: MRI IMAGING | Age: 59
Discharge: HOME | End: 2024-12-21
Attending: Other
Payer: MEDICAID

## 2024-12-21 DIAGNOSIS — M54.50 ACUTE MIDLINE LOW BACK PAIN WITHOUT SCIATICA: ICD-10-CM

## 2024-12-21 DIAGNOSIS — R41.3 MEMORY CHANGES: ICD-10-CM

## 2024-12-21 PROCEDURE — 72148 MRI LUMBAR SPINE W/O DYE: CPT | Performed by: STUDENT IN AN ORGANIZED HEALTH CARE EDUCATION/TRAINING PROGRAM

## 2024-12-21 PROCEDURE — 72146 MRI CHEST SPINE W/O DYE: CPT | Performed by: STUDENT IN AN ORGANIZED HEALTH CARE EDUCATION/TRAINING PROGRAM

## 2024-12-21 PROCEDURE — 70551 MRI BRAIN STEM W/O DYE: CPT | Performed by: OTHER

## 2024-12-22 ENCOUNTER — APPOINTMENT (OUTPATIENT)
Dept: MRI IMAGING | Age: 59
End: 2024-12-22
Attending: STUDENT IN AN ORGANIZED HEALTH CARE EDUCATION/TRAINING PROGRAM
Payer: MEDICAID

## 2024-12-22 ENCOUNTER — HOSPITAL ENCOUNTER (OUTPATIENT)
Dept: BONE DENSITY | Age: 59
Discharge: HOME | End: 2024-12-22
Attending: STUDENT IN AN ORGANIZED HEALTH CARE EDUCATION/TRAINING PROGRAM
Payer: MEDICAID

## 2024-12-22 ENCOUNTER — HOSPITAL ENCOUNTER (OUTPATIENT)
Dept: BONE DENSITY | Age: 59
End: 2024-12-22
Attending: STUDENT IN AN ORGANIZED HEALTH CARE EDUCATION/TRAINING PROGRAM
Payer: MEDICAID

## 2024-12-22 DIAGNOSIS — S22.089S CLOSED FRACTURE OF ELEVENTH THORACIC VERTEBRA, UNSPECIFIED FRACTURE MORPHOLOGY, SEQUELA: ICD-10-CM

## 2024-12-22 PROCEDURE — 77080 DXA BONE DENSITY AXIAL: CPT | Performed by: STUDENT IN AN ORGANIZED HEALTH CARE EDUCATION/TRAINING PROGRAM

## 2025-01-11 ENCOUNTER — HOSPITAL ENCOUNTER (OUTPATIENT)
Dept: CV DIAGNOSTICS | Facility: HOSPITAL | Age: 60
Discharge: HOME | End: 2025-01-11
Attending: STUDENT IN AN ORGANIZED HEALTH CARE EDUCATION/TRAINING PROGRAM
Payer: MEDICAID

## 2025-01-11 DIAGNOSIS — I77.810 ASCENDING AORTA DILATATION (HCC): ICD-10-CM

## 2025-01-11 PROCEDURE — 93306 TTE W/DOPPLER COMPLETE: CPT | Performed by: STUDENT IN AN ORGANIZED HEALTH CARE EDUCATION/TRAINING PROGRAM

## 2025-01-12 DIAGNOSIS — I77.810 ASCENDING AORTA DILATATION (HCC): Primary | ICD-10-CM

## 2025-01-13 ENCOUNTER — TELEPHONE (OUTPATIENT)
Dept: INTERNAL MEDICINE CLINIC | Facility: CLINIC | Age: 60
End: 2025-01-13

## 2025-01-22 ENCOUNTER — OFFICE VISIT (OUTPATIENT)
Dept: SLEEP CENTER | Age: 60
End: 2025-01-22
Attending: STUDENT IN AN ORGANIZED HEALTH CARE EDUCATION/TRAINING PROGRAM
Payer: MEDICAID

## 2025-01-22 DIAGNOSIS — R06.83 LOUD SNORING: Primary | ICD-10-CM

## 2025-01-22 PROCEDURE — 95810 POLYSOM 6/> YRS 4/> PARAM: CPT

## 2025-01-25 ENCOUNTER — HOSPITAL ENCOUNTER (OUTPATIENT)
Dept: CT IMAGING | Age: 60
Discharge: HOME | End: 2025-01-25
Attending: STUDENT IN AN ORGANIZED HEALTH CARE EDUCATION/TRAINING PROGRAM
Payer: MEDICAID

## 2025-01-25 DIAGNOSIS — I77.810 ASCENDING AORTA DILATATION: ICD-10-CM

## 2025-01-25 LAB
CREAT BLD-MCNC: 0.8 MG/DL
EGFRCR SERPLBLD CKD-EPI 2021: 84 ML/MIN/1.73M2 (ref 60–?)

## 2025-01-25 PROCEDURE — 71275 CT ANGIOGRAPHY CHEST: CPT | Performed by: STUDENT IN AN ORGANIZED HEALTH CARE EDUCATION/TRAINING PROGRAM

## 2025-01-25 PROCEDURE — 82565 ASSAY OF CREATININE: CPT

## 2025-01-28 ENCOUNTER — PATIENT MESSAGE (OUTPATIENT)
Facility: CLINIC | Age: 60
End: 2025-01-28

## 2025-01-29 ENCOUNTER — TELEPHONE (OUTPATIENT)
Facility: CLINIC | Age: 60
End: 2025-01-29

## 2025-01-29 DIAGNOSIS — G47.33 OSA (OBSTRUCTIVE SLEEP APNEA): Primary | ICD-10-CM

## 2025-02-11 ENCOUNTER — SLEEP STUDY (OUTPATIENT)
Facility: CLINIC | Age: 60
End: 2025-02-11
Payer: MEDICAID

## 2025-02-11 DIAGNOSIS — G47.30 SLEEP APNEA, UNSPECIFIED TYPE: Primary | ICD-10-CM

## 2025-02-11 PROCEDURE — 95810 POLYSOM 6/> YRS 4/> PARAM: CPT | Performed by: INTERNAL MEDICINE

## 2025-02-12 DIAGNOSIS — D25.9 UTERINE LEIOMYOMA, UNSPECIFIED LOCATION: ICD-10-CM

## 2025-02-12 DIAGNOSIS — N85.2 ENLARGED UTERUS: Primary | ICD-10-CM

## 2025-02-19 ENCOUNTER — TELEPHONE (OUTPATIENT)
Dept: INTERNAL MEDICINE CLINIC | Facility: CLINIC | Age: 60
End: 2025-02-19

## 2025-02-19 DIAGNOSIS — D25.9 UTERINE LEIOMYOMA, UNSPECIFIED LOCATION: ICD-10-CM

## 2025-02-19 DIAGNOSIS — N85.2 ENLARGED UTERUS: Primary | ICD-10-CM

## 2025-04-13 ENCOUNTER — PATIENT MESSAGE (OUTPATIENT)
Dept: ORTHOPEDICS CLINIC | Facility: CLINIC | Age: 60
End: 2025-04-13

## 2025-04-13 DIAGNOSIS — M54.50 ACUTE MIDLINE LOW BACK PAIN WITHOUT SCIATICA: Primary | ICD-10-CM

## 2025-04-18 NOTE — TELEPHONE ENCOUNTER
Future Appointments   Date Time Provider Department Center   5/10/2025  2:00 PM BBK US RM1 [facility]   5/11/2025 10:00 AM PF MRI RM1 (1.5T) PF [facility] [place]   6/20/2025  9:15 PM SCHEDULE BY DATE [facility]         Needs follow up visit to review MRI. To see pain clinic if worsening of pain.

## 2025-04-22 NOTE — TELEPHONE ENCOUNTER
CPAP TX scheduled for 6-, will need 31-90 & Initial sleep consult with Dr. Manoj Carrera (est'd [facility] pt) once test completed.

## 2025-05-10 ENCOUNTER — HOSPITAL ENCOUNTER (OUTPATIENT)
Dept: ULTRASOUND IMAGING | Age: 60
Discharge: HOME | End: 2025-05-10
Attending: STUDENT IN AN ORGANIZED HEALTH CARE EDUCATION/TRAINING PROGRAM
Payer: MEDICAID

## 2025-05-10 DIAGNOSIS — D25.9 UTERINE LEIOMYOMA, UNSPECIFIED LOCATION: ICD-10-CM

## 2025-05-10 DIAGNOSIS — N85.2 ENLARGED UTERUS: ICD-10-CM

## 2025-05-10 PROCEDURE — 76856 US EXAM PELVIC COMPLETE: CPT | Performed by: STUDENT IN AN ORGANIZED HEALTH CARE EDUCATION/TRAINING PROGRAM

## 2025-05-10 PROCEDURE — 76830 TRANSVAGINAL US NON-OB: CPT | Performed by: STUDENT IN AN ORGANIZED HEALTH CARE EDUCATION/TRAINING PROGRAM

## 2025-05-11 ENCOUNTER — HOSPITAL ENCOUNTER (OUTPATIENT)
Dept: MRI IMAGING | Age: 60
End: 2025-05-11
Attending: STUDENT IN AN ORGANIZED HEALTH CARE EDUCATION/TRAINING PROGRAM
Payer: MEDICAID

## 2025-05-11 ENCOUNTER — HOSPITAL ENCOUNTER (OUTPATIENT)
Dept: MRI IMAGING | Age: 60
Discharge: HOME | End: 2025-05-11
Attending: STUDENT IN AN ORGANIZED HEALTH CARE EDUCATION/TRAINING PROGRAM
Payer: MEDICAID

## 2025-05-11 DIAGNOSIS — M54.50 ACUTE MIDLINE LOW BACK PAIN WITHOUT SCIATICA: ICD-10-CM

## 2025-05-11 PROCEDURE — 72148 MRI LUMBAR SPINE W/O DYE: CPT | Performed by: STUDENT IN AN ORGANIZED HEALTH CARE EDUCATION/TRAINING PROGRAM

## 2025-05-11 PROCEDURE — 72146 MRI CHEST SPINE W/O DYE: CPT | Performed by: STUDENT IN AN ORGANIZED HEALTH CARE EDUCATION/TRAINING PROGRAM

## 2025-07-17 ENCOUNTER — LAB ENCOUNTER (OUTPATIENT)
Dept: LAB | Age: 60
End: 2025-07-17
Attending: STUDENT IN AN ORGANIZED HEALTH CARE EDUCATION/TRAINING PROGRAM
Payer: MEDICAID

## 2025-07-17 DIAGNOSIS — Z13.89 SCREENING FOR GENITOURINARY CONDITION: ICD-10-CM

## 2025-07-17 DIAGNOSIS — E87.6 HYPOKALEMIA: ICD-10-CM

## 2025-07-17 DIAGNOSIS — N20.0 KIDNEY STONE: ICD-10-CM

## 2025-07-17 LAB
BILIRUB UR QL STRIP.AUTO: NEGATIVE
CLARITY UR REFRACT.AUTO: CLEAR
GLUCOSE UR STRIP.AUTO-MCNC: NORMAL MG/DL
KETONES UR STRIP.AUTO-MCNC: NEGATIVE MG/DL
LEUKOCYTE ESTERASE UR QL STRIP.AUTO: 250
NITRITE UR QL STRIP.AUTO: NEGATIVE
PH UR STRIP.AUTO: 7 [PH] (ref 5–8)
POTASSIUM SERPL-SCNC: 4.1 MMOL/L (ref 3.5–5.1)
PROT UR STRIP.AUTO-MCNC: NEGATIVE MG/DL
SP GR UR STRIP.AUTO: 1.02 (ref 1–1.03)
UROBILINOGEN UR STRIP.AUTO-MCNC: NORMAL MG/DL

## 2025-07-17 PROCEDURE — 87086 URINE CULTURE/COLONY COUNT: CPT

## 2025-07-17 PROCEDURE — 84132 ASSAY OF SERUM POTASSIUM: CPT

## 2025-07-17 PROCEDURE — 81001 URINALYSIS AUTO W/SCOPE: CPT

## 2025-07-17 PROCEDURE — 36415 COLL VENOUS BLD VENIPUNCTURE: CPT

## 2025-07-18 ENCOUNTER — LAB ENCOUNTER (OUTPATIENT)
Dept: LAB | Age: 60
End: 2025-07-18
Attending: STUDENT IN AN ORGANIZED HEALTH CARE EDUCATION/TRAINING PROGRAM
Payer: MEDICAID

## 2025-07-18 ENCOUNTER — OFFICE VISIT (OUTPATIENT)
Dept: INTERNAL MEDICINE CLINIC | Facility: CLINIC | Age: 60
End: 2025-07-18
Payer: MEDICAID

## 2025-07-18 DIAGNOSIS — M54.9 BACK PAIN, UNSPECIFIED BACK LOCATION, UNSPECIFIED BACK PAIN LATERALITY, UNSPECIFIED CHRONICITY: ICD-10-CM

## 2025-07-18 DIAGNOSIS — R42 DIZZINESS: ICD-10-CM

## 2025-07-18 DIAGNOSIS — R53.83 FATIGUE, UNSPECIFIED TYPE: ICD-10-CM

## 2025-07-18 DIAGNOSIS — D21.9 FIBROID: ICD-10-CM

## 2025-07-18 DIAGNOSIS — K59.00 CONSTIPATION, UNSPECIFIED CONSTIPATION TYPE: ICD-10-CM

## 2025-07-18 DIAGNOSIS — R53.83 OTHER FATIGUE: ICD-10-CM

## 2025-07-18 DIAGNOSIS — N30.01 ACUTE CYSTITIS WITH HEMATURIA: Primary | ICD-10-CM

## 2025-07-18 LAB
ALBUMIN SERPL-MCNC: 4.4 G/DL (ref 3.2–4.8)
ALBUMIN/GLOB SERPL: 1.5 {RATIO} (ref 1–2)
ALP LIVER SERPL-CCNC: 88 U/L (ref 50–130)
ALT SERPL-CCNC: 13 U/L (ref 10–49)
ANION GAP SERPL CALC-SCNC: 9 MMOL/L (ref 0–18)
AST SERPL-CCNC: 18 U/L (ref ?–34)
BASOPHILS # BLD AUTO: 0.02 X10(3) UL (ref 0–0.2)
BASOPHILS NFR BLD AUTO: 0.4 %
BILIRUB SERPL-MCNC: 0.5 MG/DL (ref 0.2–1.1)
BUN BLD-MCNC: 7 MG/DL (ref 9–23)
CALCIUM BLD-MCNC: 9.5 MG/DL (ref 8.7–10.6)
CHLORIDE SERPL-SCNC: 106 MMOL/L (ref 98–112)
CO2 SERPL-SCNC: 26 MMOL/L (ref 21–32)
CREAT BLD-MCNC: 0.81 MG/DL (ref 0.55–1.02)
EGFRCR SERPLBLD CKD-EPI 2021: 83 ML/MIN/1.73M2 (ref 60–?)
EOSINOPHIL # BLD AUTO: 0.05 X10(3) UL (ref 0–0.7)
EOSINOPHIL NFR BLD AUTO: 0.9 %
ERYTHROCYTE [DISTWIDTH] IN BLOOD BY AUTOMATED COUNT: 12.9 %
EST. AVERAGE GLUCOSE BLD GHB EST-MCNC: 117 MG/DL (ref 68–126)
FASTING STATUS PATIENT QL REPORTED: YES
GLOBULIN PLAS-MCNC: 3 G/DL (ref 2–3.5)
GLUCOSE BLD-MCNC: 107 MG/DL (ref 70–99)
HBA1C MFR BLD: 5.7 % (ref ?–5.7)
HCT VFR BLD AUTO: 44.1 % (ref 35–48)
HGB BLD-MCNC: 14.9 G/DL (ref 12–16)
IMM GRANULOCYTES # BLD AUTO: 0.01 X10(3) UL (ref 0–1)
IMM GRANULOCYTES NFR BLD: 0.2 %
LYMPHOCYTES # BLD AUTO: 1.47 X10(3) UL (ref 1–4)
LYMPHOCYTES NFR BLD AUTO: 26.2 %
MCH RBC QN AUTO: 31.2 PG (ref 26–34)
MCHC RBC AUTO-ENTMCNC: 33.8 G/DL (ref 31–37)
MCV RBC AUTO: 92.5 FL (ref 80–100)
MONOCYTES # BLD AUTO: 0.3 X10(3) UL (ref 0.1–1)
MONOCYTES NFR BLD AUTO: 5.3 %
NEUTROPHILS # BLD AUTO: 3.76 X10 (3) UL (ref 1.5–7.7)
NEUTROPHILS # BLD AUTO: 3.76 X10(3) UL (ref 1.5–7.7)
NEUTROPHILS NFR BLD AUTO: 67 %
OSMOLALITY SERPL CALC.SUM OF ELEC: 290 MOSM/KG (ref 275–295)
PLATELET # BLD AUTO: 199 10(3)UL (ref 150–450)
POTASSIUM SERPL-SCNC: 3.5 MMOL/L (ref 3.5–5.1)
PROT SERPL-MCNC: 7.4 G/DL (ref 5.7–8.2)
RBC # BLD AUTO: 4.77 X10(6)UL (ref 3.8–5.3)
SODIUM SERPL-SCNC: 141 MMOL/L (ref 136–145)
TSI SER-ACNC: 3.33 UIU/ML (ref 0.55–4.78)
VIT B12 SERPL-MCNC: 258 PG/ML (ref 211–911)
VIT D+METAB SERPL-MCNC: 7.6 NG/ML (ref 30–100)
WBC # BLD AUTO: 5.6 X10(3) UL (ref 4–11)

## 2025-07-18 PROCEDURE — 99214 OFFICE O/P EST MOD 30 MIN: CPT | Performed by: STUDENT IN AN ORGANIZED HEALTH CARE EDUCATION/TRAINING PROGRAM

## 2025-07-18 PROCEDURE — 80053 COMPREHEN METABOLIC PANEL: CPT

## 2025-07-18 PROCEDURE — 36415 COLL VENOUS BLD VENIPUNCTURE: CPT

## 2025-07-18 PROCEDURE — 85025 COMPLETE CBC W/AUTO DIFF WBC: CPT

## 2025-07-18 PROCEDURE — 84443 ASSAY THYROID STIM HORMONE: CPT

## 2025-07-18 PROCEDURE — 82306 VITAMIN D 25 HYDROXY: CPT

## 2025-07-18 PROCEDURE — 82607 VITAMIN B-12: CPT

## 2025-07-18 PROCEDURE — 83036 HEMOGLOBIN GLYCOSYLATED A1C: CPT

## 2025-07-18 RX ORDER — DOCUSATE SODIUM 100 MG/1
CAPSULE, LIQUID FILLED ORAL
Qty: 60 CAPSULE | Refills: 0 | Status: SHIPPED | OUTPATIENT
Start: 2025-07-18

## 2025-07-18 RX ORDER — ONDANSETRON 4 MG/1
4 TABLET, FILM COATED ORAL
COMMUNITY
Start: 2025-01-12

## 2025-07-18 RX ORDER — FLUTICASONE PROPIONATE 50 MCG
2 SPRAY, SUSPENSION (ML) NASAL DAILY
COMMUNITY
Start: 2025-01-12

## 2025-07-18 RX ORDER — ACETAMINOPHEN 500 MG
1 TABLET ORAL DAILY
Qty: 20 CAPSULE | Refills: 0 | Status: SHIPPED | OUTPATIENT
Start: 2025-07-18

## 2025-07-18 RX ORDER — CEPHALEXIN 500 MG/1
500 CAPSULE ORAL 3 TIMES DAILY
Qty: 15 CAPSULE | Refills: 0 | Status: SHIPPED | OUTPATIENT
Start: 2025-07-18

## 2025-07-18 RX ORDER — BENZONATATE 200 MG/1
200 CAPSULE ORAL 3 TIMES DAILY PRN
COMMUNITY
Start: 2025-01-12

## 2025-07-18 NOTE — PROGRESS NOTES
OFFICE NOTE       The following individual(s) verbally consented to be recorded using ambient AI listening technology and understand that they can each withdraw their consent to this listening technology at any point by asking the clinician to turn off or pause the recording:    Patient name: Sima Malloy        Patient ID: Sima Malloy is a 59 year old female.  Today's Date: 07/18/25  Chief Complaint: Follow - Up (Follow up w/results, pain on L lower side of sthomach since 2-3 weeks, got worst, 5 out of 10, feeling lethargic, confusion.)      History of Present Illness  Sima Malloy is a 59 year old female who presents with dizziness and back pain.    She experiences dizziness upon standing, described as a 'room spinning' sensation, which has been occurring for a few weeks and worsened last week. The dizziness sometimes lasts the whole day and is accompanied by slight palpitations that subside after resting for about half an hour. She also feels fatigue, confusion, and has noticed a tendency to forget things.    She has significant back pain that prevents her from lifting even a one-year-old child. The pain is severe at night and affects her ability to maintain a normal posture. She has not yet followed up with her back specialist for this issue.    She reports a burning sensation during urination and was informed of blood in her urine. She experiences slight burning but no significant pain in the area. She has a history of urinary tract infections and has previously been treated with antibiotics and probiotics, which provided relief.    She is experiencing fatigue and weakness, which she attributes to the recent onset of her symptoms. She is concerned about her kidney function and has requested tests for GFR, creatinine, and calcium levels due to her back pain.       Vitals:    07/18/25 1224   Pulse: (!) 2   Temp: 97.6 °F (36.4 °C)   TempSrc: Tympanic   SpO2: 96%   Weight: 178 lb (80.7 kg)   Height: 5' 2\"  (1.575 m)     body mass index is 32.56 kg/m².  BP Readings from Last 3 Encounters:   11/20/24 126/74   11/09/24 130/80   11/01/24 131/80     The 10-year ASCVD risk score (Aubrie PIMENTEL, et al., 2019) is: 3.6%    Values used to calculate the score:      Age: 59 years      Sex: Female      Is Non- : No      Diabetic: No      Tobacco smoker: No      Systolic Blood Pressure: 126 mmHg      Is BP treated: No      HDL Cholesterol: 51 mg/dL      Total Cholesterol: 189 mg/dL  Results  Urinalysis: Hematuria (07/17/2025)       Medications reviewed:  Current Medications[1]      Assessment & Plan    1. Acute cystitis with hematuria (Primary)  -     Probiotic (Lactobacillus); Take 1 capsule by mouth daily.  Dispense: 20 capsule; Refill: 0  -     Cephalexin; Take 1 capsule (500 mg total) by mouth 3 (three) times daily.  Dispense: 15 capsule; Refill: 0  2. Fatigue, unspecified type  -     Comp Metabolic Panel (14); Future; Expected date: 07/18/2025  -     CBC With Differential With Platelet; Future; Expected date: 07/18/2025  -     TSH W Reflex To Free T4; Future; Expected date: 07/18/2025  -     Vitamin D; Future; Expected date: 07/18/2025  -     Hemoglobin A1C; Future; Expected date: 07/18/2025  -     Vitamin B12; Future; Expected date: 07/18/2025  3. Fibroid  -     OBG Referral - In Network  4. Back pain, unspecified back location, unspecified back pain laterality, unspecified chronicity  -     Ortho Referral - In Network  5. Constipation, unspecified constipation type  -     Docusate Sodium; Take 1-2 capsules a day with the goal of 1 BM a day. Stay well hydrated.  Dispense: 60 capsule; Refill: 0  6. Dizziness    Assessment & Plan  Dizziness  Dizziness upon standing, described as room spinning, present for a few weeks and worsening. Possible association with urinary tract infection. No significant palpitations or hypotension. Fatigue and confusion may also be related to the infection.  - Order lab tests to  check kidney function, liver function, GFR, creatinine, vitamin D, and calcium levels.    Urinary Tract Infection (UTI)  Presence of hematuria and dysuria suggestive of UTI. Possible link to dizziness, fatigue, and confusion. Antibiotics prescribed to clear the infection.  - Prescribe antibiotics for 5 days, to be taken three times a day.  - Advise taking probiotics and stool softener (Colace).    Back Pain  Chronic back pain, severe enough to prevent lifting objects. Requires follow-up with back specialist Dr. Flaco Craft.  - Refer to Dr. Flaco Craft for back pain management.    Ovarian Cyst and Uterine Myomas  Presence of ovarian cyst and uterine fibroids. Requires follow-up with gynecologist Dr. Tomasa Briggs.  - Advise follow-up with gynecologist Dr. Tomasa Hawkins.       Follow Up: As needed/if symptoms worsen or Return in about 3 months (around 10/18/2025) for Physical exam..     I spent 30 minutes obtaining pertitent medical history, reviewing pertinent imaging/labs and specialists notes, evaluating patient, discussing differential diagnosis' and various treatment options, reinforcing importance of compliance with treatment plan, and completing documentation.         Objective/ Results:   Physical Exam  Vitals reviewed.   Constitutional:       General: She is not in acute distress.     Appearance: Normal appearance. She is not ill-appearing.   HENT:      Head: Normocephalic and atraumatic.   Eyes:      Extraocular Movements: Extraocular movements intact.      Conjunctiva/sclera: Conjunctivae normal.      Pupils: Pupils are equal, round, and reactive to light.   Cardiovascular:      Rate and Rhythm: Normal rate and regular rhythm.      Heart sounds: No murmur heard.     No gallop.   Pulmonary:      Effort: Pulmonary effort is normal. No respiratory distress.      Breath sounds: Normal breath sounds. No stridor. No wheezing, rhonchi or rales.   Abdominal:      General: There is no distension.      Tenderness: There is  no abdominal tenderness. There is no guarding.   Musculoskeletal:      Right lower leg: No edema.      Left lower leg: No edema.   Skin:     General: Skin is warm.      Capillary Refill: Capillary refill takes less than 2 seconds.   Neurological:      General: No focal deficit present.      Mental Status: She is alert and oriented to person, place, and time.   Psychiatric:         Mood and Affect: Mood normal.         Behavior: Behavior normal.         Thought Content: Thought content normal.         Judgment: Judgment normal.        Physical Exam  CHEST: Lungs clear to auscultation.  CARDIOVASCULAR: Heart sounds normal.     Reviewed:    Patient Active Problem List    Diagnosis    Dizziness    Closed T11 spinal fracture (HCC)    Fatigue    Ascending aorta dilatation    PVC's (premature ventricular contractions)    Vitamin D deficiency    Memory changes      Allergies[2]   Short Social Hx on File[3]   Review of Systems   All other systems reviewed and are negative.      All other systems negative unless otherwise stated in ROS or HPI above.       Stephy Lora MD  Internal Medicine       Call office with any questions or seek emergency care if necessary.   Patient understands and agrees to follow directions and advice.      ----------------------------------------- PATIENT INSTRUCTIONS-----------------------------------------     Patient Instructions   VISIT SUMMARY:  During your visit, we discussed your dizziness, back pain, urinary symptoms, and overall fatigue. We have outlined a plan to address each of these issues and have ordered several tests to better understand your condition.    YOUR PLAN:  -DIZZINESS: Your dizziness, described as a 'room spinning' sensation, may be related to a urinary tract infection. We will conduct lab tests to check your kidney and liver function, as well as levels of GFR, creatinine, vitamin D, and calcium to identify any underlying issues.    -URINARY TRACT INFECTION (UTI): A urinary  tract infection is an infection in any part of your urinary system. You have been prescribed antibiotics to take three times a day for 5 days. Additionally, taking probiotics and a stool softener (Colace) is recommended to help manage side effects.    -BACK PAIN: Your chronic back pain is severe and affects your daily activities. You need to follow up with your back specialist, Dr. Flaco Craft, for further management and treatment.    -OVARIAN CYST AND UTERINE MYOMAS: Ovarian cysts and uterine fibroids are non-cancerous growths in the ovaries and uterus. You should follow up with your gynecologist, Dr. Tomasa Hawkins, for further evaluation and management.    INSTRUCTIONS:  Please follow up with Dr. Flaco rCaft for your back pain and Dr. Tomasa Hawkins for your ovarian cyst and uterine fibroids. Additionally, complete the lab tests as ordered to check your kidney and liver function, GFR, creatinine, vitamin D, and calcium levels.    Contains text generated by [employer]          The 21st Century Cures Act makes medical notes available to patients in the interest of transparency.  However, please be advised that this is a medical document.  It is intended as a peer to peer communication.  It is written in medical language and may contain abbreviations or verbiage that are technical and unfamiliar.  It may appear blunt or direct.  Medical documents are intended to carry relevant information, facts as evident, and the clinical opinion of the practitioner.          [1]   Current Outpatient Medications   Medication Sig Dispense Refill    ondansetron (ZOFRAN) 4 mg tablet Take 1 tablet (4 mg total) by mouth.      Probiotic, Lactobacillus, Oral Cap Take 1 capsule by mouth daily. 20 capsule 0    docusate sodium 100 MG Oral Cap Take 1-2 capsules a day with the goal of 1 BM a day. Stay well hydrated. 60 capsule 0    cephALEXin 500 MG Oral Cap Take 1 capsule (500 mg total) by mouth 3 (three) times daily. 15 capsule 0    Meloxicam 7.5 MG  Oral Tab Take 1 tablet (7.5 mg total) by mouth daily. (Patient taking differently: Take 1 tablet (7.5 mg total) by mouth daily as needed for Pain.) 14 tablet 0    benzonatate 200 MG Oral Cap Take 1 capsule (200 mg total) by mouth 3 (three) times daily as needed. (Patient not taking: Reported on 7/18/2025)      fluticasone propionate 50 MCG/ACT Nasal Suspension 2 sprays by Nasal route daily. (Patient not taking: Reported on 7/18/2025)      donepezil 5 MG Oral Tab Take 1 tablet (5 mg total) by mouth nightly. (Patient not taking: Reported on 7/18/2025) 30 tablet 3   [2] No Known Allergies  [3]   Social History  Socioeconomic History    Marital status:    Tobacco Use    Smoking status: Never     Passive exposure: Never    Smokeless tobacco: Never   Vaping Use    Vaping status: Never Used   Substance and Sexual Activity    Alcohol use: Never    Drug use: Never    Sexual activity: Not Currently   Other Topics Concern    Caffeine Concern No    Exercise No     Social Drivers of Health     Food Insecurity: No Food Insecurity (1/12/2025)    Received from [facility]    Food Insecurity     Currently or in the past 3 months, have you worried your food would run out before you had money to buy more?: No     In the past 12 months, have you run out of food or been unable to get more?: No   Transportation Needs: No Transportation Needs (1/12/2025)    Received from [facility]    Transportation Needs     Currently or in the past 3 months, has lack of transportation kept you from medical appointments, getting food or medicine, or providing care to a family member?: No     Medical Transportation Needs?: No

## 2025-07-18 NOTE — PATIENT INSTRUCTIONS
VISIT SUMMARY:  During your visit, we discussed your dizziness, back pain, urinary symptoms, and overall fatigue. We have outlined a plan to address each of these issues and have ordered several tests to better understand your condition.    YOUR PLAN:  -DIZZINESS: Your dizziness, described as a 'room spinning' sensation, may be related to a urinary tract infection. We will conduct lab tests to check your kidney and liver function, as well as levels of GFR, creatinine, vitamin D, and calcium to identify any underlying issues.    -URINARY TRACT INFECTION (UTI): A urinary tract infection is an infection in any part of your urinary system. You have been prescribed antibiotics to take three times a day for 5 days. Additionally, taking probiotics and a stool softener (Colace) is recommended to help manage side effects.    -BACK PAIN: Your chronic back pain is severe and affects your daily activities. You need to follow up with your back specialist, Dr. Flaco Craft, for further management and treatment.    -OVARIAN CYST AND UTERINE MYOMAS: Ovarian cysts and uterine fibroids are non-cancerous growths in the ovaries and uterus. You should follow up with your gynecologist, Dr. Tomasa Hawkins, for further evaluation and management.    INSTRUCTIONS:  Please follow up with Dr. Flaco Craft for your back pain and Dr. Tomasa Hawkins for your ovarian cyst and uterine fibroids. Additionally, complete the lab tests as ordered to check your kidney and liver function, GFR, creatinine, vitamin D, and calcium levels.    Contains text generated by Johana

## 2025-07-21 VITALS
SYSTOLIC BLOOD PRESSURE: 124 MMHG | DIASTOLIC BLOOD PRESSURE: 78 MMHG | TEMPERATURE: 98 F | WEIGHT: 178 LBS | OXYGEN SATURATION: 96 % | HEIGHT: 62 IN | BODY MASS INDEX: 32.76 KG/M2 | HEART RATE: 2 BPM

## 2025-07-22 ENCOUNTER — RESULTS FOLLOW-UP (OUTPATIENT)
Dept: INTERNAL MEDICINE CLINIC | Facility: CLINIC | Age: 60
End: 2025-07-22

## 2025-07-22 DIAGNOSIS — E53.8 VITAMIN B 12 DEFICIENCY: ICD-10-CM

## 2025-07-22 DIAGNOSIS — E55.9 VITAMIN D DEFICIENCY: Primary | ICD-10-CM

## 2025-07-22 RX ORDER — CYANOCOBALAMIN 1000 UG/ML
1000 INJECTION, SOLUTION INTRAMUSCULAR; SUBCUTANEOUS
Status: DISCONTINUED | OUTPATIENT
Start: 2025-08-22 | End: 2025-07-22

## 2025-07-22 RX ORDER — CYANOCOBALAMIN 1000 UG/ML
1000 INJECTION, SOLUTION INTRAMUSCULAR; SUBCUTANEOUS
Status: SHIPPED | OUTPATIENT
Start: 2025-08-22 | End: 2026-01-19

## 2025-07-22 RX ORDER — ERGOCALCIFEROL 1.25 MG/1
50000 CAPSULE, LIQUID FILLED ORAL WEEKLY
Qty: 12 CAPSULE | Refills: 1 | Status: SHIPPED | OUTPATIENT
Start: 2025-07-22

## 2025-07-22 RX ORDER — CYANOCOBALAMIN 1000 UG/ML
1000 INJECTION, SOLUTION INTRAMUSCULAR; SUBCUTANEOUS WEEKLY
Status: SHIPPED | OUTPATIENT
Start: 2025-07-22 | End: 2025-08-19

## 2025-07-23 ENCOUNTER — OFFICE VISIT (OUTPATIENT)
Dept: SLEEP CENTER | Age: 60
End: 2025-07-23
Attending: INTERNAL MEDICINE
Payer: MEDICAID

## 2025-07-23 DIAGNOSIS — G47.33 OSA (OBSTRUCTIVE SLEEP APNEA): Primary | ICD-10-CM

## 2025-07-23 PROCEDURE — 95811 POLYSOM 6/>YRS CPAP 4/> PARM: CPT

## 2025-07-24 ENCOUNTER — NURSE ONLY (OUTPATIENT)
Dept: INTERNAL MEDICINE CLINIC | Facility: CLINIC | Age: 60
End: 2025-07-24
Payer: MEDICAID

## 2025-07-24 RX ADMIN — CYANOCOBALAMIN 1000 MCG: 1000 INJECTION, SOLUTION INTRAMUSCULAR; SUBCUTANEOUS at 09:41:00

## 2025-08-01 ENCOUNTER — NURSE ONLY (OUTPATIENT)
Dept: INTERNAL MEDICINE CLINIC | Facility: CLINIC | Age: 60
End: 2025-08-01

## 2025-08-01 PROCEDURE — 96372 THER/PROPH/DIAG INJ SC/IM: CPT | Performed by: STUDENT IN AN ORGANIZED HEALTH CARE EDUCATION/TRAINING PROGRAM

## 2025-08-01 RX ADMIN — CYANOCOBALAMIN 1000 MCG: 1000 INJECTION, SOLUTION INTRAMUSCULAR; SUBCUTANEOUS at 14:47:00

## 2025-08-06 ENCOUNTER — PATIENT MESSAGE (OUTPATIENT)
Facility: CLINIC | Age: 60
End: 2025-08-06

## 2025-08-15 ENCOUNTER — TELEMEDICINE (OUTPATIENT)
Facility: CLINIC | Age: 60
End: 2025-08-15

## 2025-08-15 ENCOUNTER — NURSE ONLY (OUTPATIENT)
Dept: INTERNAL MEDICINE CLINIC | Facility: CLINIC | Age: 60
End: 2025-08-15

## 2025-08-15 DIAGNOSIS — G47.33 OBSTRUCTIVE SLEEP APNEA, ADULT: Primary | ICD-10-CM

## 2025-08-15 PROCEDURE — 99213 OFFICE O/P EST LOW 20 MIN: CPT | Performed by: PHYSICIAN ASSISTANT

## 2025-08-15 RX ADMIN — CYANOCOBALAMIN 1000 MCG: 1000 INJECTION, SOLUTION INTRAMUSCULAR; SUBCUTANEOUS at 14:34:00

## 2025-08-19 ENCOUNTER — TELEPHONE (OUTPATIENT)
Facility: CLINIC | Age: 60
End: 2025-08-19

## 2025-08-29 ENCOUNTER — OFFICE VISIT (OUTPATIENT)
Facility: CLINIC | Age: 60
End: 2025-08-29

## 2025-08-29 ENCOUNTER — NURSE ONLY (OUTPATIENT)
Dept: INTERNAL MEDICINE CLINIC | Facility: CLINIC | Age: 60
End: 2025-08-29

## 2025-08-29 ENCOUNTER — TELEPHONE (OUTPATIENT)
Dept: INTERNAL MEDICINE CLINIC | Facility: CLINIC | Age: 60
End: 2025-08-29

## 2025-08-29 VITALS
BODY MASS INDEX: 32.79 KG/M2 | HEART RATE: 64 BPM | SYSTOLIC BLOOD PRESSURE: 124 MMHG | WEIGHT: 178.19 LBS | DIASTOLIC BLOOD PRESSURE: 66 MMHG | HEIGHT: 62 IN

## 2025-08-29 DIAGNOSIS — E53.8 VITAMIN B 12 DEFICIENCY: Primary | ICD-10-CM

## 2025-08-29 DIAGNOSIS — Z12.31 ENCOUNTER FOR SCREENING MAMMOGRAM FOR BREAST CANCER: Primary | ICD-10-CM

## 2025-08-29 PROCEDURE — 99396 PREV VISIT EST AGE 40-64: CPT | Performed by: OBSTETRICS & GYNECOLOGY

## 2025-08-29 RX ORDER — CYANOCOBALAMIN 1000 UG/ML
1000 INJECTION, SOLUTION INTRAMUSCULAR; SUBCUTANEOUS ONCE
Status: COMPLETED | OUTPATIENT
Start: 2025-08-29 | End: 2025-08-29

## 2025-08-29 RX ADMIN — CYANOCOBALAMIN 1000 MCG: 1000 INJECTION, SOLUTION INTRAMUSCULAR; SUBCUTANEOUS at 14:24:00

## (undated) NOTE — LETTER
12/17/2024       Sima Malloy   [address] [facility] [place] [address]      Dear Sima,    IF YOU ARE 50 YEARS OF AGE OR OLDER, COLORECTAL CANCER SCREENING CAN SAVE YOUR LIFE.    As your primary care doctor, I want to do everything I can to protect your health.  Colorectal cancer is the second leading cause of cancer-related deaths in the [place].  Polyps and colorectal cancer do not always cause symptoms.  That's why screening is so important.  Regular screening can find colorectal cancer early when it is most curable.  These tests can help prevent the development of colorectal cancer.  All adults 50 and older should have one of the following colon cancer screenings as recommended by the American [employer] Society:    Colonoscopy every ten years or as advised by your physician  iFOBT every year (The iFOBT is a home test to detect blood in the stool. The test is quick, easy and requires no dietary restrictions.)    Please contact my office today so together we can determine which colorectal cancer screening is best for you.  Or, if you have already had one of the above colon cancer screenings, please let me know the date, method of screening, physician and/or facility that performed the screening so I can update your medical record.      If you have a history of colon cancer, colon polyps, or an abnormal colon screening result, please follow the instructions you have previously received from myself or your specialists.    There are no excuses to ignore early detection!  This is one cancer you can prevent.  Regular exams and preventive screenings are among the most important things you can do.  Thank you for taking time to take care of yourself.        Dr. Stephy Lora MD   [phone]

## (undated) NOTE — Clinical Note
Please notify patient to reschedule MRI, should be for lumbar + thoracic. Also incidental finding of kidney calcification. CT kidneys have been ordered